# Patient Record
Sex: FEMALE | Race: BLACK OR AFRICAN AMERICAN | Employment: OTHER | URBAN - METROPOLITAN AREA
[De-identification: names, ages, dates, MRNs, and addresses within clinical notes are randomized per-mention and may not be internally consistent; named-entity substitution may affect disease eponyms.]

---

## 2017-04-03 ENCOUNTER — OFFICE VISIT (OUTPATIENT)
Dept: ORTHOPEDIC SURGERY | Age: 77
End: 2017-04-03

## 2017-04-03 VITALS
BODY MASS INDEX: 28.35 KG/M2 | DIASTOLIC BLOOD PRESSURE: 72 MMHG | HEART RATE: 63 BPM | HEIGHT: 63 IN | WEIGHT: 160 LBS | RESPIRATION RATE: 16 BRPM | SYSTOLIC BLOOD PRESSURE: 129 MMHG

## 2017-04-03 DIAGNOSIS — M48.061 LUMBAR SPINAL STENOSIS: ICD-10-CM

## 2017-04-03 DIAGNOSIS — M51.36 DDD (DEGENERATIVE DISC DISEASE), LUMBAR: ICD-10-CM

## 2017-04-03 DIAGNOSIS — M54.16 LUMBAR NEURITIS: ICD-10-CM

## 2017-04-03 DIAGNOSIS — M96.1 LUMBAR POST-LAMINECTOMY SYNDROME: ICD-10-CM

## 2017-04-03 DIAGNOSIS — M54.42 LOW BACK PAIN WITH LEFT-SIDED SCIATICA, UNSPECIFIED BACK PAIN LATERALITY, UNSPECIFIED CHRONICITY: Primary | ICD-10-CM

## 2017-04-03 RX ORDER — GABAPENTIN 300 MG/1
300 CAPSULE ORAL 3 TIMES DAILY
Qty: 90 CAP | Refills: 1 | Status: SHIPPED | OUTPATIENT
Start: 2017-04-03 | End: 2017-06-16 | Stop reason: ALTCHOICE

## 2017-04-03 RX ORDER — METHYLPREDNISOLONE 4 MG/1
TABLET ORAL
Qty: 1 DOSE PACK | Refills: 0 | Status: SHIPPED | OUTPATIENT
Start: 2017-04-03 | End: 2017-05-16 | Stop reason: ALTCHOICE

## 2017-04-03 RX ORDER — HYDROCODONE BITARTRATE AND ACETAMINOPHEN 5; 325 MG/1; MG/1
TABLET ORAL
Refills: 0 | COMMUNITY
Start: 2017-02-07 | End: 2017-06-16 | Stop reason: ALTCHOICE

## 2017-04-03 RX ORDER — NAPROXEN 500 MG/1
500 TABLET ORAL 2 TIMES DAILY WITH MEALS
Qty: 30 TAB | Refills: 0 | Status: SHIPPED | OUTPATIENT
Start: 2017-04-03 | End: 2017-06-16 | Stop reason: ALTCHOICE

## 2017-04-03 NOTE — PROGRESS NOTES
[facility]  [address], [place]   Phone: [phone]  Fax: [phone]        PROGRESS NOTE      HISTORY OF PRESENT ILLNESS:  The patient is a 68 y.o. female and was seen today for follow up of right shoulder pain extending into the RUE n a C6 distribution to the 1st digit right hand, without specific injury. She states pain started on 1/5/16. She reports dropping objects with the right hand that has increased a little in frequency. She denies any recent falls. She states pain waxes and wanes in nature. Notes from Dr. Lexy Dela Cruz were reviewed. Notes from physical therapy was reviewed. Pt completed physical therapy with relief and continues with her HEP. She reports previous shoulder injection without relief. She was treated with Percocet with minimal relief. She reports Medrol Dosepak did not provide relief. Pt has been able to decrease her Neurontin to 600 mg 1 tab daily without an increase in pain. Of note, patient has a pacemaker and is not a candidate for an MRI. Cervical spine x-ray from 1/16/16 was reviewed. Per report there was no acute fracture or dislocation cervical spine. There was degenerative discogenic changes C4-C7, most prominent at C5-C6, slightly progressed from comparison study. Cervical spine CT from 2/10/16 was reviewed. Per report, there was multilevel central stenosis due to disc osteophyte complex involving C4-C5 through C6-C7 levels. Stenosis is greatest at C4-C5, mild to moderate in degree. Additional mild-to-moderate C4-C5 and mild C5-C6 cord deformity is present. No high-grade central stenosis is seen. Mild-to-moderate multilevel foraminal stenosis. A RUE EMG from 2/17/16 was reviewed. Per report, it was suggestive of a chronic C7 radiculopathy on the right sided. Patient denies fever, weight loss, or skin changes. Patient denies history of glaucoma, DM, or seizures.  At her last clinical appointment, she had been doing well and was able to decrease her Neurontin 600 mg to 1 tab daily. The patient was instructed to d/c Neurontin as tolerated. If pain increased, she was to restart the medication. She did not need refills at that time. I encouraged patient to perform her HEP daily. The patient returns today with a new complaint of low back and left groin pain into the LLE extending posteriorly to the calf worsening x 1 month. She rates pain 10/10, elevated since her last visit (0/10). Her neck, shoulder and RUE continue to do well. Pt is accompanied by her  today. This is a patient with a diagnosis of lumbar postlaminectomy syndrome with h/o lumbar spinal surgery (cannot recall which surgeon). Prior to her surgery, she did not have much in the way of low back pain but just LLE pain. Pt underwent lumbar blocks in the past but not recently. She has not recently attended PT/chirorpractic treatment. Pt has taken Aleve 2 tabs TID with temporary relief.  reviewed. Past Medical History:   Diagnosis Date    Arrhythmia     Cardiac disease     Hypercholesteremia     Hyperlipidemia     Hypertension     Pacemaker         Social History     Social History    Marital status:      Spouse name: N/A    Number of children: N/A    Years of education: N/A     Occupational History    Not on file. Social History Main Topics    Smoking status: Former Smoker     Packs/day: 0.50     Years: 30.00     Quit date: 3/21/2009    Smokeless tobacco: Never Used    Alcohol use No    Drug use: No    Sexual activity: Not on file     Other Topics Concern    Not on file     Social History Narrative       Current Outpatient Prescriptions   Medication Sig Dispense Refill    HYDROcodone-acetaminophen (NORCO) 5-325 mg per tablet take 1 tablet by mouth every 4 hours if needed  0    gabapentin (NEURONTIN) 300 mg capsule Take 1 Cap by mouth three (3) times daily.  90 Cap 1    methylPREDNISolone (MEDROL DOSEPACK) 4 mg tablet Per dose pack instructions 1 Dose Pack 0    naproxen (NAPROSYN) 500 mg tablet Take 1 Tab by mouth two (2) times daily (with meals). 30 Tab 0    furosemide (LASIX) 20 mg tablet   0    gabapentin (NEURONTIN) 300 mg capsule Take 1 Cap by mouth three (3) times daily. 90 Cap 1    amiodarone (CORDARONE) 200 mg tablet   0    amLODIPine (NORVASC) 10 mg tablet   1    losartan (COZAAR) 100 mg tablet   0    metoprolol succinate (TOPROL-XL) 25 mg XL tablet   0    potassium chloride (K-DUR, KLOR-CON) 20 mEq tablet   1    triamterene-hydrochlorothiazide (DYAZIDE) 37.5-25 mg per capsule   1    aspirin delayed-release 81 mg tablet Take  by mouth daily.  cholecalciferol (VITAMIN D3) 1,000 unit tablet Take  by mouth daily.  gabapentin (NEURONTIN) 600 mg tablet take 1 tablet by mouth three times a day 90 Tab 2    oxyCODONE-acetaminophen (PERCOCET) 5-325 mg per tablet   0    pravastatin (PRAVACHOL) 40 mg tablet   1    RANEXA 500 mg SR tablet       fluticasone (FLONASE) 50 mcg/actuation nasal spray nightly.  nitroglycerin (NITROSTAT) 0.4 mg SL tablet by SubLINGual route every five (5) minutes as needed for Chest Pain.  gabapentin (NEURONTIN) 100 mg capsule Take 1 Cap by mouth three (3) times daily. 90 Cap 1       Allergies   Allergen Reactions    Plavix [Clopidogrel] Rash          PHYSICAL EXAMINATION    Visit Vitals    /72 (BP 1 Location: Left arm, BP Patient Position: Sitting)    Pulse 63    Resp 16    Ht 5' 3\" (1.6 m)    Wt 160 lb (72.6 kg)    BMI 28.34 kg/m2       CONSTITUTIONAL: NAD, A&O x 3  SENSATION: No increase in groin pain with internal rotation of the left hip. No increased tenderness to palpation of the left SI joint. Intact to light touch throughout  NEURO: Oscar's is negative bilaterally. RANGE OF MOTION: The patient has full passive range of motion in all four extremities.   MOTOR:  Straight Leg Raise: Negative, bilateral   NURY SIGN: Negative, left     Shoulder AB/Flex Elbow Flex Wrist Ext Elbow Ext Wrist Flex Hand Intrin Tone   Right +4/5 +4/5 +4/5 +4/5 +4/5 +4/5 +4/5   Left +4/5 +4/5 +4/5 +4/5 +4/5 +4/5 +4/5              Hip Flex Knee Ext Knee Flex Ankle DF GTE Ankle PF Tone   Right +4/5 +4/5 +4/5 +4/5 +4/5 +4/5 +4/5   Left +4/5 +4/5 +4/5 +4/5 +4/5 +4/5 +4/5     RADIOGRAPHS  Preliminary reading of lumbar plain films:  Posterior fusion L3-L4. Hardware intact. Degenerative disc disease at L1-L2, L2-L3 and L4-L5. Anterior osteophytes L1-2-3-4-L5. No acute pathology identified. These are being sent out for official reading by Dr. Rubina Quezada. ASSESSMENT   Hiren Hughes was seen today for back pain and leg pain. Diagnoses and all orders for this visit:    Low back pain with left-sided sciatica, unspecified back pain laterality, unspecified chronicity  -     [33389] L/S 2-3 views  -     REFERRAL TO PHYSICAL THERAPY    Lumbar neuritis  -     REFERRAL TO PHYSICAL THERAPY    Lumbar post-laminectomy syndrome  -     REFERRAL TO PHYSICAL THERAPY    DDD (degenerative disc disease), lumbar  -     REFERRAL TO PHYSICAL THERAPY    Lumbar spinal stenosis  -     REFERRAL TO PHYSICAL THERAPY    Other orders  -     gabapentin (NEURONTIN) 300 mg capsule; Take 1 Cap by mouth three (3) times daily. -     methylPREDNISolone (MEDROL DOSEPACK) 4 mg tablet; Per dose pack instructions  -     naproxen (NAPROSYN) 500 mg tablet; Take 1 Tab by mouth two (2) times daily (with meals). IMPRESSION AND PLAN:  The patient presents with a new complaint of low back pain with left-sided radicular symptoms with previous h/o of lumbar fusion. Her neck, right shoulder and RUE continue to do well. I discussed multiple treatment options. I will start her on Medrol Dosepak. She will d/c her Aleve. I gave her a prescription for Naproxen following completion of the Medrol Dosepak. I will restart her on Neurontin 300 mg TID. I will refer her to physical therapy for her lumbar spine with an emphasis on HEP.  She should continue performing her cervical spine HEP daily. Depending on how patient responds to these treatments, we may proceed with a lumbar spine CT myelogram. I will see the patient back in 1 month's time or earlier if needed. Written by Jeffry Hernandez, as dictated by Blanco Baron MD  I examined the patient, reviewed and agree with the note.

## 2017-04-03 NOTE — MR AVS SNAPSHOT
Visit Information Date & Time Provider Department Dept. Phone Encounter #  
 4/3/2017 10:25 AM Amanda Natarajan MD [facility] [place] [phone] [id number] Follow-up Instructions Return in about 4 weeks (around 5/1/2017). Upcoming Health Maintenance Date Due DTaP/Tdap/Td series (1 - Tdap) 12/26/1961 ZOSTER VACCINE AGE 60> 12/26/2000 GLAUCOMA SCREENING Q2Y 12/26/2005 OSTEOPOROSIS SCREENING (DEXA) 12/26/2005 Pneumococcal 65+ Low/Medium Risk (1 of 2 - PCV13) 12/26/2005 MEDICARE YEARLY EXAM 12/26/2005 INFLUENZA AGE 9 TO ADULT 8/1/2016 Allergies as of 4/3/2017  Review Complete On: 4/3/2017 By: Amanda Natarajan MD  
  
 Severity Noted Reaction Type Reactions Plavix [Clopidogrel]  02/01/2016    Rash Current Immunizations  Never Reviewed No immunizations on file. Not reviewed this visit You Were Diagnosed With   
  
 Codes Comments Low back pain with left-sided sciatica, unspecified back pain laterality, unspecified chronicity    -  Primary ICD-10-CM: M54.42 
ICD-9-CM: 724.3 Lumbar neuritis     ICD-10-CM: M54.16 
ICD-9-CM: 724.4 Lumbar post-laminectomy syndrome     ICD-10-CM: M96.1 ICD-9-CM: 722.83 DDD (degenerative disc disease), lumbar     ICD-10-CM: M51.36 
ICD-9-CM: 722.52 Lumbar spinal stenosis     ICD-10-CM: M48.06 
ICD-9-CM: 724.02 Vitals BP Pulse Resp Height(growth percentile) Weight(growth percentile) BMI  
 129/72 (BP 1 Location: Left arm, BP Patient Position: Sitting) 63 16 5' 3\" (1.6 m) 160 lb (72.6 kg) 28.34 kg/m2 Smoking Status Former Smoker BMI and BSA Data Body Mass Index Body Surface Area  
 28.34 kg/m 2 1.8 m 2 Preferred Pharmacy Pharmacy Name Phone [facility] [address] [phone] Your Updated Medication List  
  
   
 This list is accurate as of: 4/3/17 10:55 AM.  Always use your most recent med list.  
  
  
  
  
 amiodarone 200 mg tablet Commonly known as:  CORDARONE  
  
 amLODIPine 10 mg tablet Commonly known as:  NORVASC  
  
 aspirin delayed-release 81 mg tablet Take  by mouth daily. cholecalciferol 1,000 unit tablet Commonly known as:  VITAMIN D3 Take  by mouth daily. fluticasone 50 mcg/actuation nasal spray Commonly known as:  FLONASE  
nightly. furosemide 20 mg tablet Commonly known as:  LASIX  
  
 * gabapentin 100 mg capsule Commonly known as:  NEURONTIN Take 1 Cap by mouth three (3) times daily. * gabapentin 300 mg capsule Commonly known as:  NEURONTIN Take 1 Cap by mouth three (3) times daily. * gabapentin 600 mg tablet Commonly known as:  NEURONTIN  
take 1 tablet by mouth three times a day * gabapentin 300 mg capsule Commonly known as:  NEURONTIN Take 1 Cap by mouth three (3) times daily. HYDROcodone-acetaminophen 5-325 mg per tablet Commonly known as:  Maryaugie Grumbles  
take 1 tablet by mouth every 4 hours if needed  
  
 losartan 100 mg tablet Commonly known as:  COZAAR  
  
 methylPREDNISolone 4 mg tablet Commonly known as:  Brand Saint Per dose pack instructions  
  
 metoprolol succinate 25 mg XL tablet Commonly known as:  TOPROL-XL  
  
 naproxen 500 mg tablet Commonly known as:  NAPROSYN Take 1 Tab by mouth two (2) times daily (with meals). nitroglycerin 0.4 mg SL tablet Commonly known as:  NITROSTAT  
by SubLINGual route every five (5) minutes as needed for Chest Pain. oxyCODONE-acetaminophen 5-325 mg per tablet Commonly known as:  PERCOCET  
  
 potassium chloride 20 mEq tablet Commonly known as:  K-DUR, KLOR-CON  
  
 pravastatin 40 mg tablet Commonly known as:  PRAVACHOL  
  
 RANEXA 500 mg SR tablet Generic drug:  ranolazine ER  
  
 triamterene-hydroCHLOROthiazide 37.5-25 mg per capsule Commonly known as:  Luz Maria Ni * Notice: This list has 4 medication(s) that are the same as other medications prescribed for you. Read the directions carefully, and ask your doctor or other care provider to review them with you. Prescriptions Sent to Pharmacy Refills  
 gabapentin (NEURONTIN) 300 mg capsule 1 Sig: Take 1 Cap by mouth three (3) times daily. Class: Normal  
 Pharmacy: [facility], [address] [facility] Ph #: [phone] Route: Oral  
 methylPREDNISolone (MEDROL DOSEPACK) 4 mg tablet 0 Sig: Per dose pack instructions Class: Normal  
 [facility] [place] [address] [facility] Ph #: [phone]  
 naproxen (NAPROSYN) 500 mg tablet 0 Sig: Take 1 Tab by mouth two (2) times daily (with meals). Class: Normal  
 Pharmacy: [facility], [address] [place] [address] Ph #: [phone] Route: Oral  
  
We Performed the Following AMB POC XRAY, SPINE, LUMBOSACRAL; 2 O [88445 CPT(R)] REFERRAL TO PHYSICAL THERAPY [XHX08 Custom] Comments:  
 DX:LBP to LLE 
HEP 
LOCATION:[place] 2-3 visits/ 2-3 weeks Follow-up Instructions Return in about 4 weeks (around 5/1/2017). Referral Information Referral ID Referred By Referred To  
  
 [id number] ALEXANDRA ALONSO III Not Available Visits Status Start Date End Date 1 New Request 4/3/17 4/3/18 If your referral has a status of pending review or denied, additional information will be sent to support the outcome of this decision. Introducing [facility]! Johanny Osorio introduces [employer] patient portal. Now you can access parts of your medical record, email your doctor's office, and request medication refills online. 1. In your internet browser, go to [email or web address] 2. Click on the First Time User? Click Here link in the Sign In box.  You will see the New Member Sign Up page. 3. Enter your Holidu Access Code exactly as it appears below. You will not need to use this code after youve completed the sign-up process. If you do not sign up before the expiration date, you must request a new code. · Holidu Access Code: [email or web address] Expires: 7/2/2017  8:15 AM 
 
4. Enter the last four digits of your Social Security Number (xxxx) and Date of Birth (mm/dd/yyyy) as indicated and click Submit. You will be taken to the next sign-up page. 5. Create a EyeQuantt ID. This will be your Holidu login ID and cannot be changed, so think of one that is secure and easy to remember. 6. Create a Holidu password. You can change your password at any time. 7. Enter your Password Reset Question and Answer. This can be used at a later time if you forget your password. 8. Enter your e-mail address. You will receive e-mail notification when new information is available in [address]. 9. Click Sign Up. You can now view and download portions of your medical record. 10. Click the Download Summary menu link to download a portable copy of your medical information. If you have questions, please visit the Frequently Asked Questions section of the Holidu website. Remember, Holidu is NOT to be used for urgent needs. For medical emergencies, dial 911. Now available from your iPhone and Android! Please provide this summary of care documentation to your next provider. Your primary care clinician is listed as Eduardo Prader. If you have any questions after today's visit, please call [phone].

## 2017-05-01 ENCOUNTER — OFFICE VISIT (OUTPATIENT)
Dept: ORTHOPEDIC SURGERY | Age: 77
End: 2017-05-01

## 2017-05-01 ENCOUNTER — DOCUMENTATION ONLY (OUTPATIENT)
Dept: ORTHOPEDIC SURGERY | Age: 77
End: 2017-05-01

## 2017-05-01 VITALS
DIASTOLIC BLOOD PRESSURE: 62 MMHG | HEART RATE: 73 BPM | HEIGHT: 63 IN | BODY MASS INDEX: 27.82 KG/M2 | WEIGHT: 157 LBS | SYSTOLIC BLOOD PRESSURE: 131 MMHG

## 2017-05-01 DIAGNOSIS — M96.1 LUMBAR POST-LAMINECTOMY SYNDROME: ICD-10-CM

## 2017-05-01 DIAGNOSIS — M47.812 SPONDYLOSIS WITHOUT MYELOPATHY OR RADICULOPATHY, CERVICAL REGION: ICD-10-CM

## 2017-05-01 DIAGNOSIS — M96.1 LUMBAR POST-LAMINECTOMY SYNDROME: Primary | ICD-10-CM

## 2017-05-01 DIAGNOSIS — M54.42 LOW BACK PAIN WITH LEFT-SIDED SCIATICA, UNSPECIFIED BACK PAIN LATERALITY, UNSPECIFIED CHRONICITY: ICD-10-CM

## 2017-05-01 DIAGNOSIS — M51.36 OTHER INTERVERTEBRAL DISC DEGENERATION, LUMBAR REGION: ICD-10-CM

## 2017-05-01 DIAGNOSIS — M54.16 RADICULOPATHY, LUMBAR REGION: ICD-10-CM

## 2017-05-01 RX ORDER — GABAPENTIN 600 MG/1
600 TABLET ORAL 3 TIMES DAILY
Qty: 90 TAB | Refills: 1 | Status: SHIPPED | OUTPATIENT
Start: 2017-05-01 | End: 2017-06-16 | Stop reason: ALTCHOICE

## 2017-05-01 NOTE — PROGRESS NOTES
[facility]  [address]  [address], [address] [place]   Phone: [phone]  Fax: [phone]        PROGRESS NOTE      HISTORY OF PRESENT ILLNESS:  The patient is a 68 y.o. female and was seen today for follow up of low back and left groin pain into the LLE extending posteriorly to the calf worsening x 2 months. This is a patient with a diagnosis of lumbar postlaminectomy syndrome with h/o lumbar spinal surgery from ~ 2012 performed by Dr. Ariana Lara. Prior to her surgery, she did not have much in the way of low back pain but just LLE pain. Pt underwent lumbar blocks in the past but not recently. She was initially seen for c/o intermittent right shoulder pain extending into the RUE n a C6 distribution to the 1st digit right hand, which she reports is doing well. She reports dropping objects with the right hand that has increased a little in frequency. Pt denies any recent falls. Notes from Dr. Miri Vizcarra were reviewed. Pt completed physical therapy for her cervical spine with relief and continues with her HEP. She reports previous shoulder injection without relief. She was treated with Percocet with minimal relief. She reports Medrol Dosepak did not provide relief. Pt has been able to decrease her Neurontin to 600 mg 1 tab daily without an increase in pain. Pt has taken Aleve 2 tabs TID with temporary relief. Pt denies fever, weight loss, or skin changes. Pt denies history of glaucoma, DM, or seizures. Of note, patient has a pacemaker and is not a candidate for an MRI. Cervical spine x-ray from 1/16/16 was reviewed. Per report there was no acute fracture or dislocation cervical spine. There was degenerative discogenic changes C4-C7, most prominent at C5-C6, slightly progressed from comparison study. Cervical spine CT from 2/10/16 was reviewed. Per report, there was multilevel central stenosis due to disc osteophyte complex involving C4-C5 through C6-C7 levels.  Stenosis is greatest at C4-C5, mild to moderate in degree. Additional mild-to-moderate C4-C5 and mild C5-C6 cord deformity is present. No high-grade central stenosis is seen. Mild-to-moderate multilevel foraminal stenosis. A RUE EMG from 2/17/16 was reviewed. Per report, it was suggestive of a chronic C7 radiculopathy on the right sided. Preliminary reading of lumbar plain films revealed posterior fusion L3-L4. Hardware intact. Degenerative disc disease at L1-L2, L2-L3 and L4-L5. Anterior osteophytes L1-2-3-4-L5. No acute pathology identified. At her last clinical appointment, the patient presented with a new complaint of low back pain with left-sided radicular symptoms with previous h/o of lumbar fusion. Her neck, right shoulder and RUE continued to do well. I discussed multiple treatment options. I started her on Medrol Dosepak. She d/cd her Aleve. I gave her a prescription for Naproxen following completion of the Medrol Dosepak. I restarted her on Neurontin 300 mg TID. I referred her to physical therapy for her lumbar spine with an emphasis on HEP. She should continue performing her cervical spine HEP daily. Depending on how patient responds to these treatments, we may proceed with a lumbar spine CT myelogram.     The patient returns today with low back pain radiating into the left groin. Pt denies radicular type symptoms of the LLE at this time. She continues to rate pain 10/10. Her pain is the worst in the AM. Therapy notes reviewed. She continues to be enrolled in physical therapy with slight relief. Pt is completing her HEP regularly. She is tolerating Neurontin 300 mg TID without relief. Pt completed Medrol Dosepak without benefit.  reviewed.        Past Medical History:   Diagnosis Date    Arrhythmia     Cardiac disease     Hypercholesteremia     Hyperlipidemia     Hypertension     Pacemaker         Social History     Social History    Marital status:      Spouse name: N/A    Number of children: N/A    Years of education: N/A     Occupational History    Not on file. Social History Main Topics    Smoking status: Former Smoker     Packs/day: 0.50     Years: 30.00     Quit date: 3/21/2009    Smokeless tobacco: Never Used    Alcohol use No    Drug use: No    Sexual activity: Not on file     Other Topics Concern    Not on file     Social History Narrative       Current Outpatient Prescriptions   Medication Sig Dispense Refill    gabapentin (NEURONTIN) 600 mg tablet Take 1 Tab by mouth three (3) times daily. 90 Tab 1    gabapentin (NEURONTIN) 300 mg capsule Take 1 Cap by mouth three (3) times daily. 90 Cap 1    naproxen (NAPROSYN) 500 mg tablet Take 1 Tab by mouth two (2) times daily (with meals). 30 Tab 0    furosemide (LASIX) 20 mg tablet   0    amiodarone (CORDARONE) 200 mg tablet   0    amLODIPine (NORVASC) 10 mg tablet   1    losartan (COZAAR) 100 mg tablet   0    metoprolol succinate (TOPROL-XL) 25 mg XL tablet   0    potassium chloride (K-DUR, KLOR-CON) 20 mEq tablet   1    pravastatin (PRAVACHOL) 40 mg tablet   1    RANEXA 500 mg SR tablet       triamterene-hydrochlorothiazide (DYAZIDE) 37.5-25 mg per capsule   1    aspirin delayed-release 81 mg tablet Take  by mouth daily.  cholecalciferol (VITAMIN D3) 1,000 unit tablet Take  by mouth daily.  fluticasone (FLONASE) 50 mcg/actuation nasal spray nightly.  nitroglycerin (NITROSTAT) 0.4 mg SL tablet by SubLINGual route every five (5) minutes as needed for Chest Pain.  HYDROcodone-acetaminophen (NORCO) 5-325 mg per tablet take 1 tablet by mouth every 4 hours if needed  0    methylPREDNISolone (MEDROL DOSEPACK) 4 mg tablet Per dose pack instructions 1 Dose Pack 0    gabapentin (NEURONTIN) 600 mg tablet take 1 tablet by mouth three times a day 90 Tab 2    gabapentin (NEURONTIN) 300 mg capsule Take 1 Cap by mouth three (3) times daily.  90 Cap 1    oxyCODONE-acetaminophen (PERCOCET) 5-325 mg per tablet   0    gabapentin (NEURONTIN) 100 mg capsule Take 1 Cap by mouth three (3) times daily. 90 Cap 1       Allergies   Allergen Reactions    Plavix [Clopidogrel] Rash          PHYSICAL EXAMINATION    Visit Vitals    /62    Pulse 73    Ht 5' 3\" (1.6 m)    Wt 157 lb (71.2 kg)    BMI 27.81 kg/m2       CONSTITUTIONAL: NAD, A&O x 3  SENSATION: No increased in groin pain with internal rotation of the left hip. Intact to light touch throughout  NEURO: Oscar's is negative bilaterally. RANGE OF MOTION: The patient has full passive range of motion in all four extremities. MOTOR:  Straight Leg Raise: Negative, bilateral     Shoulder AB/Flex Elbow Flex Wrist Ext Elbow Ext Wrist Flex Hand Intrin Tone   Right +4/5 +4/5 +4/5 +4/5 +4/5 +4/5 +4/5   Left +4/5 +4/5 +4/5 +4/5 +4/5 +4/5 +4/5              Hip Flex Knee Ext Knee Flex Ankle DF GTE Ankle PF Tone   Right +4/5 +4/5 +4/5 +4/5 +4/5 +4/5 +4/5   Left +4/5 +4/5 +4/5 +4/5 +4/5 +4/5 +4/5       ASSESSMENT   Jason Ferreira was seen today for follow-up. Diagnoses and all orders for this visit:    Lumbar post-laminectomy syndrome  -     XR MYELO LUMBAR; Future  -     CT SPINE LUMB W WO CONT; Future  -     BUN; Future  -     CREATININE; Future    Spondylosis without myelopathy or radiculopathy, cervical region  -     XR MYELO LUMBAR; Future  -     CT SPINE LUMB W WO CONT; Future  -     BUN; Future  -     CREATININE; Future    Low back pain with left-sided sciatica, unspecified back pain laterality, unspecified chronicity  -     XR MYELO LUMBAR; Future  -     CT SPINE LUMB W WO CONT; Future  -     BUN; Future  -     CREATININE; Future    Radiculopathy, lumbar region  -     XR MYELO LUMBAR; Future  -     CT SPINE LUMB W WO CONT; Future  -     BUN; Future  -     CREATININE; Future    Other intervertebral disc degeneration, lumbar region  -     XR MYELO LUMBAR; Future  -     CT SPINE LUMB W WO CONT; Future  -     BUN; Future  -     CREATININE;  Future    Other orders  -     gabapentin (NEURONTIN) 600 mg tablet; Take 1 Tab by mouth three (3) times daily. IMPRESSION AND PLAN:  Her pain has localized to the low back and left groin. She denies LLE symptoms at his time. Her right shoulder and RUE continue to do well. I will set her up for a lumbar spine MRI w/ contrast. I advised patient to bring copies of films to next visit. In the interim, I will increase her Neurontin to 600 mg TID. Patient advised to call the office if intolerant to higher dose. She should complete physical therapy as prescribed and continue her HEP daily. I will see the patient back following MRI. Written by Mauro Wilder, as dictated by Nahomi Grant MD  I examined the patient, reviewed and agree with the note.

## 2017-05-01 NOTE — PROGRESS NOTES
CT/Myelogram Lumbar with and without is scheduled at [facility], [id number], on 05/12/17. Discontinue aspirin 5 days prior, NPO after midnight. Arrive 7:00AM, test 8:00AM. No Medicare pre-authorization is required.

## 2017-05-01 NOTE — MR AVS SNAPSHOT
Visit Information Date & Time Provider Department Dept. Phone Encounter #  
 5/1/2017  9:05 AM Jocelin Nichols, [address] [facility] [place] [phone] [id number] Follow-up Instructions Return for following MRI. Upcoming Health Maintenance Date Due DTaP/Tdap/Td series (1 - Tdap) 12/26/1961 ZOSTER VACCINE AGE 60> 12/26/2000 GLAUCOMA SCREENING Q2Y 12/26/2005 OSTEOPOROSIS SCREENING (DEXA) 12/26/2005 Pneumococcal 65+ Low/Medium Risk (1 of 2 - PCV13) 12/26/2005 MEDICARE YEARLY EXAM 12/26/2005 INFLUENZA AGE 9 TO ADULT 8/1/2017 Allergies as of 5/1/2017  Review Complete On: 5/1/2017 By: Jocelin Nichols MD  
  
 Severity Noted Reaction Type Reactions Plavix [Clopidogrel]  02/01/2016    Rash Current Immunizations  Never Reviewed No immunizations on file. Not reviewed this visit You Were Diagnosed With   
  
 Codes Comments Lumbar post-laminectomy syndrome    -  Primary ICD-10-CM: M96.1 ICD-9-CM: 722.83 Spondylosis without myelopathy or radiculopathy, cervical region     ICD-10-CM: M47.812 ICD-9-CM: 721.0 Low back pain with left-sided sciatica, unspecified back pain laterality, unspecified chronicity     ICD-10-CM: M54.42 
ICD-9-CM: 724.3 Radiculopathy, lumbar region     ICD-10-CM: M54.16 
ICD-9-CM: 724.4 Other intervertebral disc degeneration, lumbar region     ICD-10-CM: M51.36 
ICD-9-CM: 722.52 Vitals BP Pulse Height(growth percentile) Weight(growth percentile) BMI Smoking Status 131/62 73 5' 3\" (1.6 m) 157 lb (71.2 kg) 27.81 kg/m2 Former Smoker Vitals History BMI and BSA Data Body Mass Index Body Surface Area  
 27.81 kg/m 2 1.78 m 2 Preferred Pharmacy Pharmacy Name Phone [facility] [address] [place] [address] [phone] Your Updated Medication List  
  
   
 This list is accurate as of: 5/1/17  9:14 AM.  Always use your most recent med list.  
  
  
  
  
 amiodarone 200 mg tablet Commonly known as:  CORDARONE  
  
 amLODIPine 10 mg tablet Commonly known as:  NORVASC  
  
 aspirin delayed-release 81 mg tablet Take  by mouth daily. cholecalciferol 1,000 unit tablet Commonly known as:  VITAMIN D3 Take  by mouth daily. fluticasone 50 mcg/actuation nasal spray Commonly known as:  FLONASE  
nightly. furosemide 20 mg tablet Commonly known as:  LASIX  
  
 * gabapentin 100 mg capsule Commonly known as:  NEURONTIN Take 1 Cap by mouth three (3) times daily. * gabapentin 300 mg capsule Commonly known as:  NEURONTIN Take 1 Cap by mouth three (3) times daily. * gabapentin 600 mg tablet Commonly known as:  NEURONTIN  
take 1 tablet by mouth three times a day * gabapentin 300 mg capsule Commonly known as:  NEURONTIN Take 1 Cap by mouth three (3) times daily. * gabapentin 600 mg tablet Commonly known as:  NEURONTIN Take 1 Tab by mouth three (3) times daily. HYDROcodone-acetaminophen 5-325 mg per tablet Commonly known as:  Ivan Brittle  
take 1 tablet by mouth every 4 hours if needed  
  
 losartan 100 mg tablet Commonly known as:  COZAAR  
  
 methylPREDNISolone 4 mg tablet Commonly known as:  Mickiel Meres Per dose pack instructions  
  
 metoprolol succinate 25 mg XL tablet Commonly known as:  TOPROL-XL  
  
 naproxen 500 mg tablet Commonly known as:  NAPROSYN Take 1 Tab by mouth two (2) times daily (with meals). nitroglycerin 0.4 mg SL tablet Commonly known as:  NITROSTAT  
by SubLINGual route every five (5) minutes as needed for Chest Pain. oxyCODONE-acetaminophen 5-325 mg per tablet Commonly known as:  PERCOCET  
  
 potassium chloride 20 mEq tablet Commonly known as:  K-DUR, KLOR-CON  
  
 pravastatin 40 mg tablet Commonly known as:  PRAVACHOL  
  
 RANEXA 500 mg SR tablet Generic drug:  ranolazine ER  
  
 triamterene-hydroCHLOROthiazide 37.5-25 mg per capsule Commonly known as:  Luis Alberto Avendaño * Notice: This list has 5 medication(s) that are the same as other medications prescribed for you. Read the directions carefully, and ask your doctor or other care provider to review them with you. Prescriptions Sent to Pharmacy Refills  
 gabapentin (NEURONTIN) 600 mg tablet 1 Sig: Take 1 Tab by mouth three (3) times daily. Class: Normal  
 Pharmacy: [address] [place] [address], [address] [place] [address] #: [phone] Route: Oral  
  
Follow-up Instructions Return for following MRI. To-Do List   
 05/01/2017 Lab:  BUN   
  
 05/01/2017 Lab:  CREATININE   
  
 05/08/2017 Imaging:  CT SPINE LUMB W WO CONT   
  
 05/08/2017 Imaging:  XR MYELO LUMBAR Introducing [facility] SERVICES! Kyleigh Mata introduces [employer] patient portal. Now you can access parts of your medical record, email your doctor's office, and request medication refills online. 1. In your internet browser, go to [email or web address] 2. Click on the First Time User? Click Here link in the Sign In box. You will see the New Member Sign Up page. 3. Enter your [employer] Access Code exactly as it appears below. You will not need to use this code after youve completed the sign-up process. If you do not sign up before the expiration date, you must request a new code. · [employer] Access Code: [email or web address] Expires: 7/2/2017  8:15 AM 
 
4. Enter the last four digits of your Social Security Number (xxxx) and Date of Birth (mm/dd/yyyy) as indicated and click Submit. You will be taken to the next sign-up page. 5. Create a [employer] ID. This will be your [employer] login ID and cannot be changed, so think of one that is secure and easy to remember. 6. Create a [employer] password. You can change your password at any time. 7. Enter your Password Reset Question and Answer. This can be used at a later time if you forget your password. 8. Enter your e-mail address. You will receive e-mail notification when new information is available in [address] 9. Click Sign Up. You can now view and download portions of your medical record. 10. Click the Download Summary menu link to download a portable copy of your medical information. If you have questions, please visit the Frequently Asked Questions section of the [employer] website. Remember, [employer] is NOT to be used for urgent needs. For medical emergencies, dial 911. Now available from your iPhone and Android! Please provide this summary of care documentation to your next provider. Your primary care clinician is listed as Andrew Padgett. If you have any questions after today's visit, please call [phone].

## 2017-05-16 ENCOUNTER — OFFICE VISIT (OUTPATIENT)
Dept: ORTHOPEDIC SURGERY | Age: 77
End: 2017-05-16

## 2017-05-16 VITALS
SYSTOLIC BLOOD PRESSURE: 119 MMHG | WEIGHT: 156 LBS | BODY MASS INDEX: 27.63 KG/M2 | DIASTOLIC BLOOD PRESSURE: 76 MMHG | RESPIRATION RATE: 18 BRPM | HEART RATE: 80 BPM

## 2017-05-16 DIAGNOSIS — M96.1 LUMBAR POST-LAMINECTOMY SYNDROME: ICD-10-CM

## 2017-05-16 DIAGNOSIS — M47.812 SPONDYLOSIS WITHOUT MYELOPATHY OR RADICULOPATHY, CERVICAL REGION: ICD-10-CM

## 2017-05-16 DIAGNOSIS — M54.16 RADICULOPATHY, LUMBAR REGION: ICD-10-CM

## 2017-05-16 DIAGNOSIS — G03.9 ARACHNOIDITIS: ICD-10-CM

## 2017-05-16 DIAGNOSIS — M48.061 LUMBAR SPINAL STENOSIS: Primary | ICD-10-CM

## 2017-05-16 RX ORDER — DULOXETIN HYDROCHLORIDE 20 MG/1
20 CAPSULE, DELAYED RELEASE ORAL DAILY
Qty: 30 CAP | Refills: 1 | Status: SHIPPED | OUTPATIENT
Start: 2017-05-16 | End: 2018-01-30 | Stop reason: ALTCHOICE

## 2017-05-16 RX ORDER — DIAZEPAM 10 MG/1
TABLET ORAL
Qty: 1 TAB | Refills: 0 | OUTPATIENT
Start: 2017-05-16 | End: 2017-06-16 | Stop reason: ALTCHOICE

## 2017-05-16 NOTE — PROGRESS NOTES
[facility]  [address]  [address], [address]   Phone: [phone]  Fax: [phone]        PROGRESS NOTE      HISTORY OF PRESENT ILLNESS:  The patient is a 68 y.o. female and was seen today for follow up of low back pain radiating into the left groin. Pt denies radicular type symptoms of the LLE at this time which previously extended posteriorly to the calf. She notes her pain is the worst in the AM. This is a patient with a diagnosis of lumbar postlaminectomy syndrome with h/o lumbar spinal surgery from ~ 2012 performed by Dr. Antwan Gaytan. Prior to her surgery, she did not have much in the way of low back pain but just LLE pain. Pt underwent lumbar blocks roughly four years ago but none recently. She was initially seen for c/o intermittent right shoulder pain extending into the RUE n a C6 distribution to the 1st digit right hand, which she reports is doing well. She reports dropping objects with the right hand that has increased a little in frequency. Pt denies any recent falls. Notes from Dr. Elder Haider were reviewed. Pt completed physical therapy for her cervical spine with relief and continues with her HEP. She reports previous shoulder injection without relief. She was treated with Percocet with minimal relief. Pt is no longer taking Aleve. She is tolerating Neurontin 300 mg TID without relief. Pt completed Medrol Dosepak without benefit. Pt denies fever, weight loss, or skin changes. Pt denies history of glaucoma, DM, or seizures. Of note, patient has a pacemaker and is not a candidate for an MRI. Cervical spine XR from 1/16/16 was reviewed. Per report there was no acute fracture or dislocation cervical spine. There was degenerative discogenic changes C4-C7, most prominent at C5-C6, slightly progressed from comparison study. Cervical spine CT from 2/10/16 was reviewed.  Per report, there was multilevel central stenosis due to disc osteophyte complex involving C4-C5 through C6-C7 levels. Stenosis is greatest at C4-C5, mild to moderate in degree. Additional mild-to-moderate C4-C5 and mild C5-C6 cord deformity is present. No high-grade central stenosis is seen. Mild-to-moderate multilevel foraminal stenosis. A RUE EMG from 2/17/16 was reviewed. Per report, it was suggestive of a chronic C7 radiculopathy on the right sided. Preliminary reading of lumbar plain films revealed posterior fusion L3-L4. Hardware intact. Degenerative disc disease at L1-L2, L2-L3 and L4-L5. Anterior osteophytes L1-2-3-4-L5. No acute pathology identified. At her last clinical appointment, her pain had localized to the low back and left groin. She denied LLE symptoms at that time. Her right shoulder and RUE continued to do well. I set her up for a lumbar spine CT myelogram. I increased her Neurontin to 600 mg TID. She should complete physical therapy as prescribed and continue her HEP daily. The patient returns today with pain location and distribution remain unchanged. Her neck and RUE continue to do well. She rates pain 7/10, a slight decrease since her last visit (10/10). Pt reports nervousness with increased dose of NEURONTIN 600 mg TID. She completed physical therapy with slight relief. Pt is completing her HEP regularly. Lumbar spine CT myelogram dated 5/12/17 reviewed. Per report, multilevel degenerative disc and degenerative facet disease with postsurgical changes at L3-4. Overall, central canal stenosis appears to be mild but the severity of the stenosis does appear to slightly increase on the upright myelographic views. Multilevel foraminal narrowing is present and is severe on the left at L4-5. progressive clumping and peripheral displacement of the cauda equina nerve roots extending below the L3 level, consistent with the sequela of arachnoiditis.  reviewed.        Past Medical History:   Diagnosis Date    Arrhythmia     Cardiac disease     Hypercholesteremia     Hyperlipidemia     Hypertension     Pacemaker         Social History     Social History    Marital status:      Spouse name: N/A    Number of children: N/A    Years of education: N/A     Occupational History    Not on file. Social History Main Topics    Smoking status: Former Smoker     Packs/day: 0.50     Years: 30.00     Quit date: 3/21/2009    Smokeless tobacco: Never Used    Alcohol use No    Drug use: No    Sexual activity: Not on file     Other Topics Concern    Not on file     Social History Narrative       Current Outpatient Prescriptions   Medication Sig Dispense Refill    DULoxetine (CYMBALTA) 20 mg capsule Take 1 Cap by mouth daily. Indications: NEUROPATHIC PAIN 30 Cap 1    gabapentin (NEURONTIN) 600 mg tablet Take 1 Tab by mouth three (3) times daily. (Patient taking differently: Take 600 mg by mouth daily.) 90 Tab 1    naproxen (NAPROSYN) 500 mg tablet Take 1 Tab by mouth two (2) times daily (with meals). 30 Tab 0    furosemide (LASIX) 20 mg tablet   0    amiodarone (CORDARONE) 200 mg tablet   0    amLODIPine (NORVASC) 10 mg tablet   1    losartan (COZAAR) 100 mg tablet   0    metoprolol succinate (TOPROL-XL) 25 mg XL tablet   0    potassium chloride (K-DUR, KLOR-CON) 20 mEq tablet   1    pravastatin (PRAVACHOL) 40 mg tablet   1    RANEXA 500 mg SR tablet       triamterene-hydrochlorothiazide (DYAZIDE) 37.5-25 mg per capsule   1    aspirin delayed-release 81 mg tablet Take  by mouth daily.  cholecalciferol (VITAMIN D3) 1,000 unit tablet Take  by mouth daily.  fluticasone (FLONASE) 50 mcg/actuation nasal spray nightly.  nitroglycerin (NITROSTAT) 0.4 mg SL tablet by SubLINGual route every five (5) minutes as needed for Chest Pain.  HYDROcodone-acetaminophen (NORCO) 5-325 mg per tablet take 1 tablet by mouth every 4 hours if needed  0    gabapentin (NEURONTIN) 300 mg capsule Take 1 Cap by mouth three (3) times daily.  (Patient taking differently: Take 300 mg by mouth two (2) times a day.) 90 Cap 1    oxyCODONE-acetaminophen (PERCOCET) 5-325 mg per tablet   0       Allergies   Allergen Reactions    Plavix [Clopidogrel] Rash          PHYSICAL EXAMINATION    Visit Vitals    /76    Pulse 80    Resp 18    Wt 156 lb (70.8 kg)    BMI 27.63 kg/m2       CONSTITUTIONAL: NAD, A&O x 3  SENSATION: Increased groin pain with internal rotation of the left hip. Intact to light touch throughout  NEURO: Oscar's is negative bilaterally. RANGE OF MOTION: Good ROM, left hip. The patient has full passive range of motion in all four extremities. MOTOR:  Straight Leg Raise: Negative, bilateral     Shoulder AB/Flex Elbow Flex Wrist Ext Elbow Ext Wrist Flex Hand Intrin Tone   Right +4/5 +4/5 +4/5 +4/5 +4/5 +4/5 +4/5   Left +4/5 +4/5 +4/5 +4/5 +4/5 +4/5 +4/5              Hip Flex Knee Ext Knee Flex Ankle DF GTE Ankle PF Tone   Right +4/5 +4/5 +4/5 +4/5 +4/5 +4/5 +4/5   Left +4/5 +4/5 +4/5 +4/5 +4/5 +4/5 +4/5       ASSESSMENT   Scott Richard was seen today for back pain. Diagnoses and all orders for this visit:    Lumbar spinal stenosis  -     DULoxetine (CYMBALTA) 20 mg capsule; Take 1 Cap by mouth daily. Indications: NEUROPATHIC PAIN  -     SCHEDULE SURGERY    Radiculopathy, lumbar region  -     DULoxetine (CYMBALTA) 20 mg capsule; Take 1 Cap by mouth daily. Indications: NEUROPATHIC PAIN  -     SCHEDULE SURGERY    Lumbar post-laminectomy syndrome  -     DULoxetine (CYMBALTA) 20 mg capsule; Take 1 Cap by mouth daily. Indications: NEUROPATHIC PAIN  -     SCHEDULE SURGERY    Spondylosis without myelopathy or radiculopathy, cervical region  -     DULoxetine (CYMBALTA) 20 mg capsule; Take 1 Cap by mouth daily. Indications: NEUROPATHIC PAIN  -     SCHEDULE SURGERY    Arachnoiditis  -     DULoxetine (CYMBALTA) 20 mg capsule; Take 1 Cap by mouth daily. Indications: NEUROPATHIC PAIN  -     SCHEDULE SURGERY          IMPRESSION AND PLAN:  She elects to proceed with lumbar blocks.  I will order L4/5 epidural. In the interim, patient will wean off Neurontin. I will try her on Cymbalta 20 mg per day. The risks, benefits, and potential side effects of this medication were discussed. Patient understands and wishes to proceed. Patient advised to call the office if intolerant to new medication. I encourage patient to perform her HEP daily. I will see the patient back following block. Written by Yang Murphy, as dictated by Reyes Horne MD  I examined the patient, reviewed and agree with the note.

## 2017-05-16 NOTE — MR AVS SNAPSHOT
Visit Information Date & Time Provider Department Dept. Phone Encounter #  
 5/16/2017  8:10 AM Karley Tovar MD [facility], [address] [facility] - [place] [id number] [id number] Follow-up Instructions Return for following block. Upcoming Health Maintenance Date Due DTaP/Tdap/Td series (1 - Tdap) 12/26/1961 ZOSTER VACCINE AGE 60> 12/26/2000 GLAUCOMA SCREENING Q2Y 12/26/2005 OSTEOPOROSIS SCREENING (DEXA) 12/26/2005 Pneumococcal 65+ Low/Medium Risk (1 of 2 - PCV13) 12/26/2005 MEDICARE YEARLY EXAM 12/26/2005 INFLUENZA AGE 9 TO ADULT 8/1/2017 Allergies as of 5/16/2017  Review Complete On: 5/16/2017 By: Karley Tovar MD  
  
 Severity Noted Reaction Type Reactions Plavix [Clopidogrel]  02/01/2016    Rash Current Immunizations  Never Reviewed No immunizations on file. Not reviewed this visit You Were Diagnosed With   
  
 Codes Comments Lumbar spinal stenosis    -  Primary ICD-10-CM: M48.06 
ICD-9-CM: 724.02 Radiculopathy, lumbar region     ICD-10-CM: M54.16 
ICD-9-CM: 724.4 Lumbar post-laminectomy syndrome     ICD-10-CM: M96.1 ICD-9-CM: 722.83 Spondylosis without myelopathy or radiculopathy, cervical region     ICD-10-CM: M47.812 ICD-9-CM: 721.0 Arachnoiditis     ICD-10-CM: G03.9 ICD-9-CM: 322.9 Vitals BP Pulse Resp Weight(growth percentile) BMI Smoking Status 119/76 80 18 156 lb (70.8 kg) 27.63 kg/m2 Former Smoker BMI and BSA Data Body Mass Index Body Surface Area  
 27.63 kg/m 2 1.77 m 2 Preferred Pharmacy Pharmacy Name Phone [facility] [address] [place] [address] [phone] Your Updated Medication List  
  
   
This list is accurate as of: 5/16/17  8:53 AM.  Always use your most recent med list.  
  
  
  
  
 amiodarone 200 mg tablet Commonly known as:  CORDARONE  
  
 amLODIPine 10 mg tablet Commonly known as:  NORVASC  
  
 aspirin delayed-release 81 mg tablet Take  by mouth daily. cholecalciferol 1,000 unit tablet Commonly known as:  VITAMIN D3 Take  by mouth daily. DULoxetine 20 mg capsule Commonly known as:  CYMBALTA Take 1 Cap by mouth daily. Indications: NEUROPATHIC PAIN  
  
 fluticasone 50 mcg/actuation nasal spray Commonly known as:  FLONASE  
nightly. furosemide 20 mg tablet Commonly known as:  LASIX  
  
 * gabapentin 300 mg capsule Commonly known as:  NEURONTIN Take 1 Cap by mouth three (3) times daily. * gabapentin 600 mg tablet Commonly known as:  NEURONTIN Take 1 Tab by mouth three (3) times daily. HYDROcodone-acetaminophen 5-325 mg per tablet Commonly known as:  Bhavik Dayton  
take 1 tablet by mouth every 4 hours if needed  
  
 losartan 100 mg tablet Commonly known as:  COZAAR  
  
 metoprolol succinate 25 mg XL tablet Commonly known as:  TOPROL-XL  
  
 naproxen 500 mg tablet Commonly known as:  NAPROSYN Take 1 Tab by mouth two (2) times daily (with meals). nitroglycerin 0.4 mg SL tablet Commonly known as:  NITROSTAT  
by SubLINGual route every five (5) minutes as needed for Chest Pain. oxyCODONE-acetaminophen 5-325 mg per tablet Commonly known as:  PERCOCET  
  
 potassium chloride 20 mEq tablet Commonly known as:  K-DUR KLOR-CON  
  
 pravastatin 40 mg tablet Commonly known as:  PRAVACHOL  
  
 RANEXA 500 mg SR tablet Generic drug:  ranolazine ER  
  
 triamterene-hydroCHLOROthiazide 37.5-25 mg per capsule Commonly known as:  Romayne Marie * Notice: This list has 2 medication(s) that are the same as other medications prescribed for you. Read the directions carefully, and ask your doctor or other care provider to review them with you. Prescriptions Sent to Pharmacy Refills DULoxetine (CYMBALTA) 20 mg capsule 1 Sig: Take 1 Cap by mouth daily.  Indications: NEUROPATHIC PAIN  
 Class: Normal  
 Pharmacy: [facility], [facility] #: [phone] Route: Oral  
  
Follow-up Instructions Return for following block. Introducing [facility] SERVICES! Raquel Kyle introduces [employer] patient portal. Now you can access parts of your medical record, email your doctor's office, and request medication refills online. 1. In your internet browser, go to [email or web address] 2. Click on the First Time User? Click Here link in the Sign In box. You will see the New Member Sign Up page. 3. Enter your [employer] Access Code exactly as it appears below. You will not need to use this code after youve completed the sign-up process. If you do not sign up before the expiration date, you must request a new code. · [employer] Access Code: [email or web address] Expires: 7/2/2017  8:15 AM 
 
4. Enter the last four digits of your Social Security Number (xxxx) and Date of Birth (mm/dd/yyyy) as indicated and click Submit. You will be taken to the next sign-up page. 5. Create a [employer] ID. This will be your [employer] login ID and cannot be changed, so think of one that is secure and easy to remember. 6. Create a [employer] password. You can change your password at any time. 7. Enter your Password Reset Question and Answer. This can be used at a later time if you forget your password. 8. Enter your e-mail address. You will receive e-mail notification when new information is available in [address] 9. Click Sign Up. You can now view and download portions of your medical record. 10. Click the Download Summary menu link to download a portable copy of your medical information. If you have questions, please visit the Frequently Asked Questions section of the [employer] website. Remember, [employer] is NOT to be used for urgent needs. For medical emergencies, dial 911. Now available from your iPhone and Android! Please provide this summary of care documentation to your next provider. Your primary care clinician is listed as Alan Arrington. If you have any questions after today's visit, please call [phone].

## 2017-05-24 DIAGNOSIS — M47.812 SPONDYLOSIS WITHOUT MYELOPATHY OR RADICULOPATHY, CERVICAL REGION: ICD-10-CM

## 2017-05-24 DIAGNOSIS — M54.16 RADICULOPATHY, LUMBAR REGION: ICD-10-CM

## 2017-05-24 DIAGNOSIS — M51.36 OTHER INTERVERTEBRAL DISC DEGENERATION, LUMBAR REGION: ICD-10-CM

## 2017-05-24 DIAGNOSIS — M54.42 LOW BACK PAIN WITH LEFT-SIDED SCIATICA, UNSPECIFIED BACK PAIN LATERALITY, UNSPECIFIED CHRONICITY: ICD-10-CM

## 2017-05-24 DIAGNOSIS — M96.1 LUMBAR POST-LAMINECTOMY SYNDROME: ICD-10-CM

## 2017-06-16 ENCOUNTER — OFFICE VISIT (OUTPATIENT)
Dept: ORTHOPEDIC SURGERY | Age: 77
End: 2017-06-16

## 2017-06-16 VITALS — RESPIRATION RATE: 12 BRPM | BODY MASS INDEX: 27.07 KG/M2 | WEIGHT: 152.8 LBS | HEIGHT: 63 IN

## 2017-06-16 DIAGNOSIS — G03.9 ARACHNOIDITIS: ICD-10-CM

## 2017-06-16 DIAGNOSIS — M48.061 LUMBAR SPINAL STENOSIS: ICD-10-CM

## 2017-06-16 DIAGNOSIS — M96.1 LUMBAR POST-LAMINECTOMY SYNDROME: Primary | ICD-10-CM

## 2017-06-16 DIAGNOSIS — M54.16 RADICULOPATHY, LUMBAR REGION: ICD-10-CM

## 2017-06-16 DIAGNOSIS — M51.36 OTHER INTERVERTEBRAL DISC DEGENERATION, LUMBAR REGION: ICD-10-CM

## 2017-06-16 RX ORDER — DULOXETIN HYDROCHLORIDE 30 MG/1
30 CAPSULE, DELAYED RELEASE ORAL DAILY
Qty: 30 CAP | Refills: 1 | Status: SHIPPED | OUTPATIENT
Start: 2017-06-16 | End: 2017-09-20 | Stop reason: SDUPTHER

## 2017-06-16 NOTE — PROGRESS NOTES
[facility]  [address], [place]   Phone: [phone]  Fax: [phone]        PROGRESS NOTE      HISTORY OF PRESENT ILLNESS:  The patient is a 68 y.o. female and was seen today for follow up of low back pain radiating into the left groin. Pt denies radicular type symptoms of the LLE at this time which previously extended posteriorly to the calf. She notes her pain is the worst in the AM. This is a patient with a diagnosis of lumbar postlaminectomy syndrome with h/o lumbar spinal surgery from ~ 2012 performed by Dr. Danial Espinosa. Prior to her surgery, she did not have much in the way of low back pain but just LLE pain. Pt underwent lumbar blocks roughly four years ago but none recently. She was initially seen for c/o intermittent right shoulder pain extending into the RUE n a C6 distribution to the 1st digit right hand, which she reports is doing well. She reports dropping objects with the right hand that has increased a little in frequency. Pt denies any recent falls. Notes from Dr. Fernanda Joyce were reviewed. Pt completed physical therapy for her cervical spine with relief and continues with her HEP. She reports previous shoulder injection without relief. Pt completed physical therapy with slight relief. Pt is completing her HEP regularly. She was treated with Percocet with minimal relief. Pt is no longer taking Aleve. Pt completed Medrol Dosepak without benefit. Pt reports nervousness with increased dose of NEURONTIN 600 mg TID. Pt denies fever, weight loss, or skin changes. Pt denies history of glaucoma, DM, or seizures. Of note, patient has a pacemaker and is not a candidate for an MRI. Cervical spine XR from 1/16/16 was reviewed. Per report there was no acute fracture or dislocation cervical spine. There was degenerative discogenic changes C4-C7, most prominent at C5-C6, slightly progressed from comparison study. Cervical spine CT from 2/10/16 was reviewed. Per report, there was multilevel central stenosis due to disc osteophyte complex involving C4-C5 through C6-C7 levels. Stenosis is greatest at C4-C5, mild to moderate in degree. Additional mild-to-moderate C4-C5 and mild C5-C6 cord deformity is present. No high-grade central stenosis is seen. Mild-to-moderate multilevel foraminal stenosis. A RUE EMG from 2/17/16 was reviewed. Per report, it was suggestive of a chronic C7 radiculopathy on the right sided. Preliminary reading of lumbar plain films revealed posterior fusion L3-L4. Hardware intact. Degenerative disc disease at L1-L2, L2-L3 and L4-L5. Anterior osteophytes L1-2-3-4-L5. No acute pathology identified. Lumbar spine CT myelogram dated 5/12/17 reviewed. Per report, multilevel degenerative disc and degenerative facet disease with postsurgical changes at L3-4. Overall, central canal stenosis appears to be mild but the severity of the stenosis does appear to slightly increase on the upright myelographic views. Multilevel foraminal narrowing is present and is severe on the left at L4-5. progressive clumping and peripheral displacement of the cauda equina nerve roots extending below the L3 level, consistent with the sequela of arachnoiditis. At her last clinical appointment, she elected to proceed with lumbar blocks. I ordered L4/5 epidural. Patient weaned off Neurontin. I tried her on Cymbalta 20 mg per day. I encouraged patient to perform her HEP daily. The patient returns today with low back pain. She denies left groin pain at this time. Her neck and RUE continue to do well. She rates pain 0-7/10, an improvement since her last visit (7/10). Pt underwent L4/5 epidural on 5/23/17 with good relief. She is tolerating Cymbalta 20 mg per day noting slight improvement in symptoms. Pt performs her HEP daily.  reviewed.        Past Medical History:   Diagnosis Date    Arrhythmia     Cardiac disease     Hypercholesteremia     Hyperlipidemia     Hypertension  Pacemaker         Social History     Social History    Marital status:      Spouse name: N/A    Number of children: N/A    Years of education: N/A     Occupational History    Not on file. Social History Main Topics    Smoking status: Former Smoker     Packs/day: 0.50     Years: 30.00     Quit date: 3/21/2009    Smokeless tobacco: Never Used    Alcohol use No    Drug use: No    Sexual activity: Not on file     Other Topics Concern    Not on file     Social History Narrative       Current Outpatient Prescriptions   Medication Sig Dispense Refill    DULoxetine (CYMBALTA) 30 mg capsule Take 1 Cap by mouth daily. 30 Cap 1    DULoxetine (CYMBALTA) 20 mg capsule Take 1 Cap by mouth daily. Indications: NEUROPATHIC PAIN 30 Cap 1    furosemide (LASIX) 20 mg tablet   0    amiodarone (CORDARONE) 200 mg tablet   0    amLODIPine (NORVASC) 10 mg tablet   1    losartan (COZAAR) 100 mg tablet   0    metoprolol succinate (TOPROL-XL) 25 mg XL tablet   0    potassium chloride (K-DUR, KLOR-CON) 20 mEq tablet   1    pravastatin (PRAVACHOL) 40 mg tablet   1    RANEXA 500 mg SR tablet       triamterene-hydrochlorothiazide (DYAZIDE) 37.5-25 mg per capsule   1    aspirin delayed-release 81 mg tablet Take  by mouth daily.  cholecalciferol (VITAMIN D3) 1,000 unit tablet Take  by mouth daily.  fluticasone (FLONASE) 50 mcg/actuation nasal spray nightly.  nitroglycerin (NITROSTAT) 0.4 mg SL tablet by SubLINGual route every five (5) minutes as needed for Chest Pain. Allergies   Allergen Reactions    Plavix [Clopidogrel] Rash          PHYSICAL EXAMINATION    Visit Vitals    Resp 12    Ht 5' 3\" (1.6 m)    Wt 152 lb 12.8 oz (69.3 kg)    BMI 27.07 kg/m2       CONSTITUTIONAL: NAD, A&O x 3  SENSATION: Intact to light touch throughout  RANGE OF MOTION: The patient has full passive range of motion in all four extremities.   MOTOR:  Straight Leg Raise: Negative, bilateral               Hip Flex Knee Ext Knee Flex Ankle DF GTE Ankle PF Tone   Right +4/5 +4/5 +4/5 +4/5 +4/5 +4/5 +4/5   Left +4/5 +4/5 +4/5 +4/5 +4/5 +4/5 +4/5       ASSESSMENT   Johnathon Snell was seen today for back pain and follow-up. Diagnoses and all orders for this visit:    Lumbar post-laminectomy syndrome    Lumbar spinal stenosis    Radiculopathy, lumbar region    Other intervertebral disc degeneration, lumbar region    Arachnoiditis    Other orders  -     DULoxetine (CYMBALTA) 30 mg capsule; Take 1 Cap by mouth daily. IMPRESSION AND PLAN:  The patient is s/P L4/5 epidural noting improvement in symptoms. I will increase her Cymbalta to 30 mg per day. Patient advised to call the office if intolerant to higher dose. I encourage patient to perform her HEP daily. I will see the patient back in 1 month's time or earlier if needed. Written by Colby Burt, as dictated by Cr Irwin MD  I examined the patient, reviewed and agree with the note.

## 2017-06-16 NOTE — MR AVS SNAPSHOT
Visit Information Date & Time Provider Department Dept. Phone Encounter #  
 6/16/2017  9:05 AM Mansi Tineo, [address] [facility] [place] [phone] [id number] Follow-up Instructions Return in about 4 weeks (around 7/14/2017). Upcoming Health Maintenance Date Due DTaP/Tdap/Td series (1 - Tdap) 12/26/1961 ZOSTER VACCINE AGE 60> 12/26/2000 GLAUCOMA SCREENING Q2Y 12/26/2005 OSTEOPOROSIS SCREENING (DEXA) 12/26/2005 Pneumococcal 65+ Low/Medium Risk (1 of 2 - PCV13) 12/26/2005 MEDICARE YEARLY EXAM 12/26/2005 INFLUENZA AGE 9 TO ADULT 8/1/2017 Allergies as of 6/16/2017  Review Complete On: 6/16/2017 By: Mansi Tineo MD  
  
 Severity Noted Reaction Type Reactions Plavix [Clopidogrel]  02/01/2016    Rash Current Immunizations  Never Reviewed No immunizations on file. Not reviewed this visit You Were Diagnosed With   
  
 Codes Comments Lumbar post-laminectomy syndrome    -  Primary ICD-10-CM: M96.1 ICD-9-CM: 722.83 Lumbar spinal stenosis     ICD-10-CM: M48.06 
ICD-9-CM: 724.02 Radiculopathy, lumbar region     ICD-10-CM: M54.16 
ICD-9-CM: 724.4 Other intervertebral disc degeneration, lumbar region     ICD-10-CM: M51.36 
ICD-9-CM: 722.52 Arachnoiditis     ICD-10-CM: G03.9 ICD-9-CM: 322.9 Vitals Resp Height(growth percentile) Weight(growth percentile) BMI Smoking Status 12 5' 3\" (1.6 m) 152 lb 12.8 oz (69.3 kg) 27.07 kg/m2 Former Smoker BMI and BSA Data Body Mass Index Body Surface Area  
 27.07 kg/m 2 1.76 m 2 Preferred Pharmacy Pharmacy Name Phone [facility] [address] [facility], [address] [place] [address] Rd [phone] Your Updated Medication List  
  
   
This list is accurate as of: 6/16/17  9:48 AM.  Always use your most recent med list.  
  
  
  
  
 amiodarone 200 mg tablet Commonly known as:  CORDARONE  
  
 amLODIPine 10 mg tablet Commonly known as:  NORVASC  
  
 aspirin delayed-release 81 mg tablet Take  by mouth daily. cholecalciferol 1,000 unit tablet Commonly known as:  VITAMIN D3 Take  by mouth daily. * DULoxetine 20 mg capsule Commonly known as:  CYMBALTA Take 1 Cap by mouth daily. Indications: NEUROPATHIC PAIN  
  
 * DULoxetine 30 mg capsule Commonly known as:  CYMBALTA Take 1 Cap by mouth daily. fluticasone 50 mcg/actuation nasal spray Commonly known as:  FLONASE  
nightly. furosemide 20 mg tablet Commonly known as:  LASIX  
  
 losartan 100 mg tablet Commonly known as:  COZAAR  
  
 metoprolol succinate 25 mg XL tablet Commonly known as:  TOPROL-XL  
  
 nitroglycerin 0.4 mg SL tablet Commonly known as:  NITROSTAT  
by SubLINGual route every five (5) minutes as needed for Chest Pain.  
  
 potassium chloride 20 mEq tablet Commonly known as:  K-DUR, KLOR-CON  
  
 pravastatin 40 mg tablet Commonly known as:  PRAVACHOL  
  
 RANEXA 500 mg SR tablet Generic drug:  ranolazine ER  
  
 triamterene-hydroCHLOROthiazide 37.5-25 mg per capsule Commonly known as:  Suha Mendoza * Notice: This list has 2 medication(s) that are the same as other medications prescribed for you. Read the directions carefully, and ask your doctor or other care provider to review them with you. Prescriptions Sent to Pharmacy Refills DULoxetine (CYMBALTA) 30 mg capsule 1 Sig: Take 1 Cap by mouth daily. Class: Normal  
 Pharmacy: [address] [place] [address] #: [phone] Route: Oral  
  
Follow-up Instructions Return in about 4 weeks (around 7/14/2017). Introducing [facility]! Olaf Trevizo introduces [employer] patient portal. Now you can access parts of your medical record, email your doctor's office, and request medication refills online.    
 
1. In your internet browser, go to [email or web address] 2. Click on the First Time User? Click Here link in the Sign In box. You will see the New Member Sign Up page. 3. Enter your EdSurge Access Code exactly as it appears below. You will not need to use this code after youve completed the sign-up process. If you do not sign up before the expiration date, you must request a new code. · [employer]ge Access Code: [email or web address] Expires: 7/2/2017  8:15 AM 
 
4. Enter the last four digits of your Social Security Number (xxxx) and Date of Birth (mm/dd/yyyy) as indicated and click Submit. You will be taken to the next sign-up page. 5. Create a [employer] ID. This will be your EdSurge login ID and cannot be changed, so think of one that is secure and easy to remember. 6. Create a Ed[employer]ge password. You can change your password at any time. 7. Enter your Password Reset Question and Answer. This can be used at a later time if you forget your password. 8. Enter your e-mail address. You will receive e-mail notification when new information is available in [address] 9. Click Sign Up. You can now view and download portions of your medical record. 10. Click the Download Summary menu link to download a portable copy of your medical information. If you have questions, please visit the Frequently Asked Questions section of the EdSurge website. Remember, EdSurge is NOT to be used for urgent needs. For medical emergencies, dial 911. Now available from your iPhone and Android! Please provide this summary of care documentation to your next provider. Your primary care clinician is listed as Jesu Oliver. If you have any questions after today's visit, please call [phone].

## 2017-09-12 NOTE — TELEPHONE ENCOUNTER
Last Visit: 06/16/2017 with MD Renee Pham    Next Appointment: Office Reschedule 07/19 & 07/24; No show 08/21  Previous Refill Encounters: 06/16/2017 per MD Renee Pham #[id number] with 1 refill     Requested Prescriptions     Pending Prescriptions Disp Refills    DULoxetine (CYMBALTA) 30 mg capsule 30 Cap 0     Sig: Take 1 Cap by mouth daily.

## 2017-09-13 RX ORDER — DULOXETIN HYDROCHLORIDE 30 MG/1
30 CAPSULE, DELAYED RELEASE ORAL DAILY
Qty: 30 CAP | Refills: 0 | OUTPATIENT
Start: 2017-09-13

## 2017-09-13 NOTE — TELEPHONE ENCOUNTER
Please contact the patient for scheduling per refusal reason below. Thanks.      Refused by: Abe Rosa MD  Refusal reason: Appt required, please call patient

## 2017-09-20 ENCOUNTER — OFFICE VISIT (OUTPATIENT)
Dept: ORTHOPEDIC SURGERY | Age: 77
End: 2017-09-20

## 2017-09-20 VITALS
WEIGHT: 150.2 LBS | RESPIRATION RATE: 16 BRPM | SYSTOLIC BLOOD PRESSURE: 114 MMHG | HEART RATE: 67 BPM | HEIGHT: 63 IN | BODY MASS INDEX: 26.61 KG/M2 | DIASTOLIC BLOOD PRESSURE: 63 MMHG

## 2017-09-20 DIAGNOSIS — M48.061 LUMBAR SPINAL STENOSIS: ICD-10-CM

## 2017-09-20 DIAGNOSIS — M96.1 LUMBAR POST-LAMINECTOMY SYNDROME: ICD-10-CM

## 2017-09-20 DIAGNOSIS — M54.42 LOW BACK PAIN WITH LEFT-SIDED SCIATICA, UNSPECIFIED BACK PAIN LATERALITY, UNSPECIFIED CHRONICITY: Primary | ICD-10-CM

## 2017-09-20 RX ORDER — NAPROXEN 500 MG/1
TABLET ORAL
Refills: 0 | COMMUNITY
Start: 2017-09-14

## 2017-09-20 RX ORDER — DULOXETIN HYDROCHLORIDE 30 MG/1
30 CAPSULE, DELAYED RELEASE ORAL DAILY
Qty: 30 CAP | Refills: 5 | Status: SHIPPED | OUTPATIENT
Start: 2017-09-20 | End: 2018-01-30 | Stop reason: SDUPTHER

## 2017-09-20 RX ORDER — BENZONATATE 100 MG/1
CAPSULE ORAL
Refills: 0 | COMMUNITY
Start: 2017-09-14 | End: 2018-05-15 | Stop reason: ALTCHOICE

## 2017-09-20 NOTE — PROGRESS NOTES
Chief complaint/History of Present Illness:  Chief Complaint   Patient presents with    Back Pain     Follow up and med refill     HPI  Isai Larsen is a  68 y.o.  female      HISTORY OF PRESENT ILLNESS:  The patient comes in today for followup of her low back pain. She gets pain into the left lower extremity occasionally. She states the lumbar epidural at L4-L5 done May 23, 2017, helped her a lot but over the last month, her pain has started to come back, and she would like to have the epidural repeated. She is also on Cymbalta 30 mg daily. She ran out last Friday. She is not having any side effects from coming off of it, but states she does feel like it does help when she is on it. She denies fever or bowel or bladder dysfunction. PHYSICAL EXAM:  Ms. Ozzie Hamilton is a 68-year-old female. She is alert and oriented. She has a full weight bearing, non-antalgic gait. She has 4/5 strength of the bilateral lower extremities and negative straight leg raise. She does have pain with hyperextension of the lumbar spine. ASSESSENT/PLAN:  This is a patient who has lumbar spinal stenosis, post lumbar laminectomy syndrome, some low back pain with left-sided sciatica. We are going to go ahead and repeat the L4-L5 epidurals. She knows she can only get three in a years time. We have refilled her Cymbalta. We will see her back after the block.         Review of systems:    Past Medical History:   Diagnosis Date    Arrhythmia     Cardiac disease     Hypercholesteremia     Hyperlipidemia     Hypertension     Pacemaker      Past Surgical History:   Procedure Laterality Date    BREAST SURGERY PROCEDURE UNLISTED Bilateral     bilateral breast reducution post procedure orderset 688 cyst right breast removed    CARDIAC SURG PROCEDURE UNLIST      coronary artery bypass graft, coronary stent placement     Social History     Social History    Marital status:      Spouse name: N/A    Number of children: N/A  Years of education: N/A     Occupational History    Not on file. Social History Main Topics    Smoking status: Former Smoker     Packs/day: 0.50     Years: 30.00     Quit date: 3/21/2009    Smokeless tobacco: Never Used    Alcohol use No    Drug use: No    Sexual activity: Not on file     Other Topics Concern    Not on file     Social History Narrative     Family History   Problem Relation Age of Onset    Hypertension Mother     Coronary Artery Disease Mother     Hypertension Father     Coronary Artery Disease Brother     Stroke Maternal Grandmother        Physical Exam:  Visit Vitals    /63    Pulse 67    Resp 16    Ht 5' 3\" (1.6 m)    Wt 150 lb 3.2 oz (68.1 kg)    BMI 26.61 kg/m2     Pain Scale: 5/10     has been . reviewed and is appropriate        Diagnoses and all orders for this visit:    1. Low back pain with left-sided sciatica, unspecified back pain laterality, unspecified chronicity  -     DULoxetine (CYMBALTA) 30 mg capsule; Take 1 Cap by mouth daily.  -     SCHEDULE SURGERY    2. Lumbar post-laminectomy syndrome  -     DULoxetine (CYMBALTA) 30 mg capsule; Take 1 Cap by mouth daily.  -     SCHEDULE SURGERY    3. Lumbar spinal stenosis  -     DULoxetine (CYMBALTA) 30 mg capsule; Take 1 Cap by mouth daily.  -     SCHEDULE SURGERY            Follow-up Disposition:  Return for after block.         We have informed Gume Chatman to notify us for immediate appointment if she has any worsening neurogical symptoms or if an emergency situation presents, then call 911

## 2017-10-18 ENCOUNTER — OFFICE VISIT (OUTPATIENT)
Dept: ORTHOPEDIC SURGERY | Age: 77
End: 2017-10-18

## 2017-10-18 VITALS
BODY MASS INDEX: 27.61 KG/M2 | HEART RATE: 70 BPM | HEIGHT: 63 IN | RESPIRATION RATE: 16 BRPM | DIASTOLIC BLOOD PRESSURE: 66 MMHG | WEIGHT: 155.8 LBS | SYSTOLIC BLOOD PRESSURE: 136 MMHG

## 2017-10-18 DIAGNOSIS — M48.062 SPINAL STENOSIS OF LUMBAR REGION WITH NEUROGENIC CLAUDICATION: Primary | ICD-10-CM

## 2017-10-18 DIAGNOSIS — M54.42 LOW BACK PAIN WITH LEFT-SIDED SCIATICA, UNSPECIFIED BACK PAIN LATERALITY, UNSPECIFIED CHRONICITY: ICD-10-CM

## 2017-10-18 DIAGNOSIS — M96.1 LUMBAR POST-LAMINECTOMY SYNDROME: ICD-10-CM

## 2017-10-18 RX ORDER — CREAM BASE NO.171
240 CREAM (GRAM) MISCELLANEOUS 4 TIMES DAILY
Qty: 240 G | Refills: 3 | Status: SHIPPED | OUTPATIENT
Start: 2017-10-18 | End: 2021-08-19

## 2017-10-18 NOTE — PROGRESS NOTES
Chief complaint/History of Present Illness:  Chief Complaint   Patient presents with    Back Pain     Follow up block injection    Leg Pain     LLSHARON Storm is a  68 y.o.  female      HISTORY OF PRESENT ILLNESS:  The patient comes in today following her L4-5 lumbar epidural done on October 6, 2017. She states it did help a little bit but not as much as previous ones. Cymbalta 30 mg daily controls her left lower extremity pain. Her main complaint is low back pain. She states it is very stiff in the morning and gets better as the day goes on. She rates it as a 5/10 now. She does take occasional Naprosyn through her PCP. Most of the time she will take it at night and then in the morning she feels better and is able to move around, but she does not like to take it very often. She is a nonsmoker. She is retired. She denies fever or bowel or bladder dysfunction. She has had lumbar surgery. She states it was a fusion 10 years ago by Dr. Santa Hernandez. PHYSICAL EXAM:  Ms. Ugo Mckeon is a 66-year-old female. She is alert and oriented. Affect is normal.  She has a normal mood and affect. She has a full weight bearing, non-antalgic gait. She has pain with hyperextension of the lumbar spine and 4/5 strength of the bilateral lower extremities. She has a negative straight leg raise. ASSESSENT/PLAN:  This is a patient with post-lumbar laminectomy syndrome and lumbar spinal stenosis that gives her low back pain with left-sided sciatica. Her sciatica is controlled with Cymbalta 30 mg. We will continue that. I am going to try a compounded cream with an anti-inflammatory property for her low back pain. I have sent that to [facility]. They should contact her and send it to her in the mail. She has a  at home that can rub it into her back. She has enough Cymbalta. We will see her back in three months or sooner if needed.         Review of systems:    Past Medical History:   Diagnosis Date    Arrhythmia     Cardiac disease     Hypercholesteremia     Hyperlipidemia     Hypertension     Pacemaker      Past Surgical History:   Procedure Laterality Date    BREAST SURGERY PROCEDURE UNLISTED Bilateral     bilateral breast reducution post procedure orderset 688 cyst right breast removed    CARDIAC SURG PROCEDURE UNLIST      coronary artery bypass graft, coronary stent placement     Social History     Social History    Marital status:      Spouse name: N/A    Number of children: N/A    Years of education: N/A     Occupational History    Not on file. Social History Main Topics    Smoking status: Former Smoker     Packs/day: 0.50     Years: 30.00     Quit date: 3/21/2009    Smokeless tobacco: Never Used    Alcohol use No    Drug use: No    Sexual activity: Not on file     Other Topics Concern    Not on file     Social History Narrative     Family History   Problem Relation Age of Onset    Hypertension Mother     Coronary Artery Disease Mother     Hypertension Father     Coronary Artery Disease Brother     Stroke Maternal Grandmother        Physical Exam:  Visit Vitals    /66    Pulse 70    Resp 16    Ht 5' 3\" (1.6 m)    Wt 155 lb 12.8 oz (70.7 kg)    BMI 27.6 kg/m2     Pain Scale: 5/10     has been . reviewed and is appropriate        Diagnoses and all orders for this visit:    1. Spinal stenosis of lumbar region with neurogenic claudication    2. Low back pain with left-sided sciatica, unspecified back pain laterality, unspecified chronicity    3. Lumbar post-laminectomy syndrome    Other orders  -     COMPOUNDMAX BASE crea; 240 g by Does Not Apply route four (4) times daily. Anti-Inflam Diclofenac Sodium 3%Gabapentin 3% Lidocaine 2% Prilocaine 2%            Follow-up Disposition:  Return in about 3 months (around 1/18/2018) for with Lenore.         We have informed Liz Smith to notify us for immediate appointment if she has any worsening neurogical symptoms or if an emergency situation presents, then call 911

## 2017-10-18 NOTE — PATIENT INSTRUCTIONS
Back Pain: Care Instructions  Your Care Instructions    Back pain has many possible causes. It is often related to problems with muscles and ligaments of the back. It may also be related to problems with the nerves, discs, or bones of the back. Moving, lifting, standing, sitting, or sleeping in an awkward way can strain the back. Sometimes you don't notice the injury until later. Arthritis is another common cause of back pain. Although it may hurt a lot, back pain usually improves on its own within several weeks. Most people recover in 12 weeks or less. Using good home treatment and being careful not to stress your back can help you feel better sooner. Follow-up care is a key part of your treatment and safety. Be sure to make and go to all appointments, and call your doctor if you are having problems. Its also a good idea to know your test results and keep a list of the medicines you take. How can you care for yourself at home? · Sit or lie in positions that are most comfortable and reduce your pain. Try one of these positions when you lie down:  ¨ Lie on your back with your knees bent and supported by large pillows. ¨ Lie on the floor with your legs on the seat of a sofa or chair. Ralene Vinita on your side with your knees and hips bent and a pillow between your legs. ¨ Lie on your stomach if it does not make pain worse. · Do not sit up in bed, and avoid soft couches and twisted positions. Bed rest can help relieve pain at first, but it delays healing. Avoid bed rest after the first day of back pain. · Change positions every 30 minutes. If you must sit for long periods of time, take breaks from sitting. Get up and walk around, or lie in a comfortable position. · Try using a heating pad on a low or medium setting for 15 to 20 minutes every 2 or 3 hours. Try a warm shower in place of one session with the heating pad. · You can also try an ice pack for 10 to 15 minutes every 2 to 3 hours.  Put a thin cloth between the ice pack and your skin. · Take pain medicines exactly as directed. ¨ If the doctor gave you a prescription medicine for pain, take it as prescribed. ¨ If you are not taking a prescription pain medicine, ask your doctor if you can take an over-the-counter medicine. · Take short walks several times a day. You can start with 5 to 10 minutes, 3 or 4 times a day, and work up to longer walks. Walk on level surfaces and avoid hills and stairs until your back is better. · Return to work and other activities as soon as you can. Continued rest without activity is usually not good for your back. · To prevent future back pain, do exercises to stretch and strengthen your back and stomach. Learn how to use good posture, safe lifting techniques, and proper body mechanics. When should you call for help? Call your doctor now or seek immediate medical care if:  · You have new or worsening numbness in your legs. · You have new or worsening weakness in your legs. (This could make it hard to stand up.)  · You lose control of your bladder or bowels. Watch closely for changes in your health, and be sure to contact your doctor if:  · Your pain gets worse. · You are not getting better after 2 weeks. Where can you learn more? Go to http://camille-albaro.info/. Enter V620 in the search box to learn more about \"Back Pain: Care Instructions. \"  Current as of: March 21, 2017  Content Version: 11.3  © 6225-9395 [employer]. Care instructions adapted under license by [employer] (which disclaims liability or warranty for this information). If you have questions about a medical condition or this instruction, always ask your healthcare professional. [address] any warranty or liability for your use of this information.

## 2018-01-30 ENCOUNTER — OFFICE VISIT (OUTPATIENT)
Dept: ORTHOPEDIC SURGERY | Age: 78
End: 2018-01-30

## 2018-01-30 VITALS
HEART RATE: 80 BPM | DIASTOLIC BLOOD PRESSURE: 62 MMHG | HEIGHT: 63 IN | SYSTOLIC BLOOD PRESSURE: 124 MMHG | RESPIRATION RATE: 14 BRPM | BODY MASS INDEX: 26.44 KG/M2 | WEIGHT: 149.2 LBS

## 2018-01-30 DIAGNOSIS — M54.40 LOW BACK PAIN WITH SCIATICA, SCIATICA LATERALITY UNSPECIFIED, UNSPECIFIED BACK PAIN LATERALITY, UNSPECIFIED CHRONICITY: ICD-10-CM

## 2018-01-30 DIAGNOSIS — M96.1 LUMBAR POST-LAMINECTOMY SYNDROME: Primary | ICD-10-CM

## 2018-01-30 DIAGNOSIS — M48.062 SPINAL STENOSIS OF LUMBAR REGION WITH NEUROGENIC CLAUDICATION: ICD-10-CM

## 2018-01-30 DIAGNOSIS — M54.42 LOW BACK PAIN WITH LEFT-SIDED SCIATICA, UNSPECIFIED BACK PAIN LATERALITY, UNSPECIFIED CHRONICITY: ICD-10-CM

## 2018-01-30 RX ORDER — DULOXETIN HYDROCHLORIDE 30 MG/1
30 CAPSULE, DELAYED RELEASE ORAL DAILY
Qty: 90 CAP | Refills: 1 | Status: SHIPPED | OUTPATIENT
Start: 2018-01-30 | End: 2019-07-15 | Stop reason: SDUPTHER

## 2018-01-30 NOTE — MR AVS SNAPSHOT
[address] [facility] 
 
 
 [address] [facility] 
[phone] Patient: Yumiko Jarrell MRN: [id number] SBW:[id number] Visit Information Date & Time Provider Department Dept. Phone Encounter #  
 1/30/2018  8:30 AM Alanna Gagnon MD [facility] [place] [phone] [id number] Follow-up Instructions Return in about 6 months (around 7/30/2018). Upcoming Health Maintenance Date Due DTaP/Tdap/Td series (1 - Tdap) 12/26/1961 ZOSTER VACCINE AGE 60> 10/26/2000 GLAUCOMA SCREENING Q2Y 12/26/2005 OSTEOPOROSIS SCREENING (DEXA) 12/26/2005 Pneumococcal 65+ Low/Medium Risk (1 of 2 - PCV13) 12/26/2005 MEDICARE YEARLY EXAM 12/26/2005 Influenza Age 5 to Adult 8/1/2017 Allergies as of 1/30/2018  Review Complete On: 1/30/2018 By: Alanna Gagnon MD  
  
 Severity Noted Reaction Type Reactions Plavix [Clopidogrel]  02/01/2016    Rash Current Immunizations  Never Reviewed No immunizations on file. Not reviewed this visit You Were Diagnosed With   
  
 Codes Comments Lumbar post-laminectomy syndrome    -  Primary ICD-10-CM: M96.1 ICD-9-CM: 722.83 Spinal stenosis of lumbar region with neurogenic claudication     ICD-10-CM: M48.062 
ICD-9-CM: 724.03 Low back pain with left-sided sciatica, unspecified back pain laterality, unspecified chronicity     ICD-10-CM: M54.42 
ICD-9-CM: 724.3 Vitals BP Pulse Resp Height(growth percentile) Weight(growth percentile) BMI  
 124/62 80 14 5' 3\" (1.6 m) 149 lb 3.2 oz (67.7 kg) 26.43 kg/m2 Smoking Status Former Smoker BMI and BSA Data Body Mass Index Body Surface Area  
 26.43 kg/m 2 1.73 m 2 Preferred Pharmacy Pharmacy Name Phone [facility] [address] [phone] Your Updated Medication List  
  
   
 This list is accurate as of: 1/30/18  8:48 AM.  Always use your most recent med list.  
  
  
  
  
 amiodarone 200 mg tablet Commonly known as:  CORDARONE Take 200 mg by mouth daily. amLODIPine 10 mg tablet Commonly known as:  Alexy Pruitt Take 10 mg by mouth daily. aspirin delayed-release 81 mg tablet Take  by mouth daily. benzonatate 100 mg capsule Commonly known as:  TESSALON  
TAKE 1 CAPSULE BY MOUTH 3 TIMES A DAY AS NEEDED. SWALLOW PILLS WHOLE  
  
 cholecalciferol 1,000 unit tablet Commonly known as:  VITAMIN D3 Take  by mouth daily. COMPOUNDMAX BASE Crea Generic drug:  cream base no. 171 (bulk) 240 g by Does Not Apply route four (4) times daily. Anti-Inflam Diclofenac Sodium 3%Gabapentin 3% Lidocaine 2% Prilocaine 2% DULoxetine 30 mg capsule Commonly known as:  CYMBALTA Take 1 Cap by mouth daily. Indications: NEUROPATHIC PAIN  
  
 fluticasone 50 mcg/actuation nasal spray Commonly known as:  [place] Goody 2 Sprays by Both Nostrils route daily. furosemide 20 mg tablet Commonly known as:  LASIX Take 20 mg by mouth daily as needed. losartan 100 mg tablet Commonly known as:  COZAAR Take 100 mg by mouth daily. metoprolol succinate 25 mg XL tablet Commonly known as:  TOPROL-XL Take 25 mg by mouth daily. naproxen 500 mg tablet Commonly known as:  NAPROSYN  
take 1 tablet by mouth twice a day if needed for pain  
  
 nitroglycerin 0.4 mg SL tablet Commonly known as:  NITROSTAT  
by SubLINGual route every five (5) minutes as needed for Chest Pain.  
  
 potassium chloride 20 mEq tablet Commonly known as:  K-DUR, KLOR-CON Take 20 mEq by mouth daily as needed. pravastatin 40 mg tablet Commonly known as:  PRAVACHOL Take 40 mg by mouth daily. RANEXA 500 mg SR tablet Generic drug:  ranolazine ER Take 500 mg by mouth daily. triamterene-hydroCHLOROthiazide 37.5-25 mg per capsule Commonly known as:  iLam Jaffe  
 Take 1 Cap by mouth every morning. Prescriptions Sent to Pharmacy Refills DULoxetine (CYMBALTA) 30 mg capsule 1 Sig: Take 1 Cap by mouth daily. Indications: NEUROPATHIC PAIN Class: Normal  
 Pharmacy: [address] [place] [address], [address] [facility] #: [phone] Route: Oral  
  
Follow-up Instructions Return in about 6 months (around 7/30/2018). Introducing [facility]! [facility] introduces MEDEM patient portal. Now you can access parts of your medical record, email your doctor's office, and request medication refills online. 1. In your internet browser, go to [email or web address] 2. Click on the First Time User? Click Here link in the Sign In box. You will see the New Member Sign Up page. 3. Enter your MEDEM Access Code exactly as it appears below. You will not need to use this code after youve completed the sign-up process. If you do not sign up before the expiration date, you must request a new code. · MEDEM Access Code: [email or web address] Expires: 4/22/2018 12:02 PM 
 
4. Enter the last four digits of your Social Security Number (xxxx) and Date of Birth (mm/dd/yyyy) as indicated and click Submit. You will be taken to the next sign-up page. 5. Create a MEDEM ID. This will be your MEDEM login ID and cannot be changed, so think of one that is secure and easy to remember. 6. Create a MEDEM password. You can change your password at any time. 7. Enter your Password Reset Question and Answer. This can be used at a later time if you forget your password. 8. Enter your e-mail address. You will receive e-mail notification when new information is available in [address] 9. Click Sign Up. You can now view and download portions of your medical record. 10. Click the Download Summary menu link to download a portable copy of your medical information. If you have questions, please visit the Frequently Asked Questions section of the [employer] website. Remember, [employer] is NOT to be used for urgent needs. For medical emergencies, dial 911. Now available from your iPhone and Android! Please provide this summary of care documentation to your next provider. Your primary care clinician is listed as Marshal Blevins. If you have any questions after today's visit, please call [phone].

## 2018-01-30 NOTE — PROGRESS NOTES
[facility]  [address], [place]   Phone: [phone]  Fax: [phone]        PROGRESS NOTE      HISTORY OF PRESENT ILLNESS:  The patient is a 68 y.o. female and was seen today for follow up of low back pain. She denies left groin pain at this time. Pt denies radicular type symptoms of the LLE at this time which previously extended posteriorly to the calf. She notes her pain is the worst in the AM. Her neck and RUE continue to do well. This is a patient with a diagnosis of lumbar postlaminectomy syndrome with h/o lumbar spinal surgery from ~ 2012 performed by Dr. Nuñez. Prior to her surgery, she did not have much in the way of low back pain but just LLE pain. Pt underwent lumbar blocks roughly four years ago but none recently. She was initially seen for c/o intermittent right shoulder pain extending into the RUE n a C6 distribution to the 1st digit right hand, which she reports is doing well. Pt denies any recent falls. Notes from Dr. Elias Andrews were reviewed. Pt completed physical therapy for her cervical spine with relief and continues with her HEP. She reports previous shoulder injection without relief. Pt completed physical therapy with slight relief. Pt is completing her HEP regularly. She was treated with Percocet with minimal relief. Pt is no longer taking Aleve. Pt completed Medrol Dosepak without benefit. Pt reports nervousness with increased dose of NEURONTIN 600 mg TID. Pt underwent L4/5 epidural on 5/23/17 with good relief. Pt denies fever, weight loss, or skin changes. Pt denies history of glaucoma, DM, or seizures. Of note, patient has a pacemaker and is not a candidate for an MRI. Cervical spine XR from 1/16/16 was reviewed. Per report there was no acute fracture or dislocation cervical spine. There was degenerative discogenic changes C4-C7, most prominent at C5-C6, slightly progressed from comparison study.  Cervical spine CT from 2/10/16 was reviewed. Per report, there was multilevel central stenosis due to disc osteophyte complex involving C4-C5 through C6-C7 levels. Stenosis is greatest at C4-C5, mild to moderate in degree. Additional mild-to-moderate C4-C5 and mild C5-C6 cord deformity is present. No high-grade central stenosis is seen. Mild-to-moderate multilevel foraminal stenosis. A RUE EMG from 2/17/16 was reviewed. Per report, it was suggestive of a chronic C7 radiculopathy on the right sided. Preliminary reading of lumbar plain films revealed posterior fusion L3-L4. Hardware intact. Degenerative disc disease at L1-L2, L2-L3 and L4-L5. Anterior osteophytes L1-2-3-4-L5. No acute pathology identified. Lumbar spine CT myelogram dated 5/12/17 reviewed. Per report, multilevel degenerative disc and degenerative facet disease with postsurgical changes at L3-4. Overall, central canal stenosis appears to be mild but the severity of the stenosis does appear to slightly increase on the upright myelographic views. Multilevel foraminal narrowing is present and is severe on the left at L4-5. progressive clumping and peripheral displacement of the cauda equina nerve roots extending below the L3 level, consistent with the sequela of arachnoiditis. At her last clinical appointment, the patient was s/p L4/5 epidural noting improvement in symptoms. I increased her Cymbalta to 30 mg per day. I encouraged patient to perform her HEP daily. The patient returns today with low back pain. She states her extremity pain continues to do well. She rates pain 0-5/10, a decrease since her last visit (0-7/10). Her pain is not positional. Patient tolerates Cymbalta 30 mg every day well with benefit. Patient continues performing her HEP daily. Last seen by me on 6/16/17. She more recently saw Wes Hernandez NP. Note from Wes Hernandez NP dated 10/18/17 indicating patient was seen with c/o L4-5 lumbar epidural done on October 6, 2017.   She states it did help a little bit but not as much as previous ones. At that time she was given a compound cream and was scheduled to f/u in 3 months.  reviewed. Body mass index is 26.43 kg/(m^2). Past Medical History:   Diagnosis Date    Arrhythmia     Cardiac disease     Hypercholesteremia     Hyperlipidemia     Hypertension     Pacemaker         Social History     Social History    Marital status:      Spouse name: N/A    Number of children: N/A    Years of education: N/A     Occupational History    Not on file. Social History Main Topics    Smoking status: Former Smoker     Packs/day: 0.50     Years: 30.00     Quit date: 3/21/2009    Smokeless tobacco: Never Used    Alcohol use No    Drug use: No    Sexual activity: Not on file     Other Topics Concern    Not on file     Social History Narrative       Current Outpatient Prescriptions   Medication Sig Dispense Refill    DULoxetine (CYMBALTA) 30 mg capsule Take 1 Cap by mouth daily. Indications: NEUROPATHIC PAIN 90 Cap 1    COMPOUNDMAX BASE crea 240 g by Does Not Apply route four (4) times daily. Anti-Inflam Diclofenac Sodium 3%Gabapentin 3% Lidocaine 2% Prilocaine 2% 240 g 3    benzonatate (TESSALON) 100 mg capsule TAKE 1 CAPSULE BY MOUTH 3 TIMES A DAY AS NEEDED. SWALLOW PILLS WHOLE  0    naproxen (NAPROSYN) 500 mg tablet take 1 tablet by mouth twice a day if needed for pain  0    furosemide (LASIX) 20 mg tablet Take 20 mg by mouth daily as needed. 0    amiodarone (CORDARONE) 200 mg tablet Take 200 mg by mouth daily. 0    amLODIPine (NORVASC) 10 mg tablet Take 10 mg by mouth daily. 1    losartan (COZAAR) 100 mg tablet Take 100 mg by mouth daily. 0    metoprolol succinate (TOPROL-XL) 25 mg XL tablet Take 25 mg by mouth daily. 0    potassium chloride (K-DUR, KLOR-CON) 20 mEq tablet Take 20 mEq by mouth daily as needed. 1    pravastatin (PRAVACHOL) 40 mg tablet Take 40 mg by mouth daily. 1    RANEXA 500 mg SR tablet Take 500 mg by mouth daily.  aspirin delayed-release 81 mg tablet Take  by mouth daily.  cholecalciferol (VITAMIN D3) 1,000 unit tablet Take  by mouth daily.  fluticasone (FLONASE) 50 mcg/actuation nasal spray 2 Sprays by Both Nostrils route daily.  nitroglycerin (NITROSTAT) 0.4 mg SL tablet by SubLINGual route every five (5) minutes as needed for Chest Pain.  triamterene-hydrochlorothiazide (DYAZIDE) 37.5-25 mg per capsule Take 1 Cap by mouth every morning. 1       Allergies   Allergen Reactions    Plavix [Clopidogrel] Rash          PHYSICAL EXAMINATION    Visit Vitals    /62    Pulse 80    Resp 14    Ht 5' 3\" (1.6 m)    Wt 149 lb 3.2 oz (67.7 kg)    BMI 26.43 kg/m2       CONSTITUTIONAL: NAD, A&O x 3  SENSATION: Intact to light touch throughout  NEURO: Oscar's is negative bilaterally. RANGE OF MOTION: The patient has full passive range of motion in all four extremities. MOTOR:  Straight Leg Raise: Negative, bilateral     Shoulder AB/Flex Elbow Flex Wrist Ext Elbow Ext Wrist Flex Hand Intrin Tone   Right +4/5 +4/5 +4/5 +4/5 +4/5 +4/5 +4/5   Left +4/5 +4/5 +4/5 +4/5 +4/5 +4/5 +4/5              Hip Flex Knee Ext Knee Flex Ankle DF GTE Ankle PF Tone   Right +4/5 +4/5 +4/5 +4/5 +4/5 +4/5 +4/5   Left +4/5 +4/5 +4/5 +4/5 +4/5 +4/5 +4/5       ASSESSMENT   Diagnoses and all orders for this visit:    1. Lumbar post-laminectomy syndrome  -     DULoxetine (CYMBALTA) 30 mg capsule; Take 1 Cap by mouth daily. Indications: NEUROPATHIC PAIN    2. Spinal stenosis of lumbar region with neurogenic claudication    3. Low back pain with left-sided sciatica, unspecified back pain laterality, unspecified chronicity  -     DULoxetine (CYMBALTA) 30 mg capsule; Take 1 Cap by mouth daily. Indications: NEUROPATHIC PAIN    4. Low back pain with sciatica, sciatica laterality unspecified, unspecified back pain laterality, unspecified chronicity    5.  Body mass index 27.0-27.9, adult          IMPRESSION AND PLAN:  Patient's left lower extremity appears to be doing well. She continues to endorse lumbar spine pain. Patient is neurologically intact. Patient wished to continue her current treatment. I will refill her Cymbalta 30 mg every day. I encourage patient to perform her HEP daily. I will see the patient back in 6 month's time or earlier if needed. Written by Marina Alvarez, as dictated by Kathy Rico MD  I examined the patient, reviewed and agree with the note.

## 2018-05-15 ENCOUNTER — OFFICE VISIT (OUTPATIENT)
Dept: ORTHOPEDIC SURGERY | Age: 78
End: 2018-05-15

## 2018-05-15 VITALS
DIASTOLIC BLOOD PRESSURE: 64 MMHG | HEIGHT: 63 IN | WEIGHT: 154 LBS | SYSTOLIC BLOOD PRESSURE: 133 MMHG | HEART RATE: 84 BPM | BODY MASS INDEX: 27.29 KG/M2

## 2018-05-15 DIAGNOSIS — M54.16 RADICULOPATHY, LUMBAR REGION: ICD-10-CM

## 2018-05-15 DIAGNOSIS — M51.36 OTHER INTERVERTEBRAL DISC DEGENERATION, LUMBAR REGION: ICD-10-CM

## 2018-05-15 DIAGNOSIS — M48.062 SPINAL STENOSIS OF LUMBAR REGION WITH NEUROGENIC CLAUDICATION: ICD-10-CM

## 2018-05-15 DIAGNOSIS — M96.1 LUMBAR POST-LAMINECTOMY SYNDROME: Primary | ICD-10-CM

## 2018-05-15 NOTE — PROGRESS NOTES
[facility]  [address], [address]  Phone: [phone]  Fax: [phone]        PROGRESS NOTE      HISTORY OF PRESENT ILLNESS:  The patient is a 68 y.o. female and was seen today for follow up of low back pain. She denies left groin pain at this time. Pt denies radicular type symptoms of the LLE at this time which previously extended posteriorly to the calf. She notes her pain is the worst in the AM. Her neck and RUE continue to do well. This is a patient with a diagnosis of lumbar postlaminectomy syndrome with h/o lumbar spinal surgery from ~ 2012 performed by Dr. Henry Gomes. Prior to her surgery, she did not have much in the way of low back pain but just LLE pain. Pt underwent lumbar blocks roughly four years ago but none recently. She was initially seen for c/o intermittent right shoulder pain extending into the RUE n a C6 distribution to the 1st digit right hand, which she reports is doing well. Pt denies any recent falls. Notes from Dr. Debbie Mccauley were reviewed. Pt completed physical therapy for her cervical spine with relief and continues with her HEP. She reports previous shoulder injection without relief. Pt completed physical therapy with slight relief. Pt is completing her HEP regularly. She was treated with Percocet with minimal relief. Pt is no longer taking Aleve. Pt completed Medrol Dosepak without benefit. Pt reports nervousness with increased dose of NEURONTIN 600 mg TID. Pt underwent L4/5 epidural on 5/23/17 with good relief. Pt underwent L4/5 lumbar epidural on 10/6/17 with mild relief. Pt denies fever, weight loss, or skin changes. Pt denies history of glaucoma, DM, or seizures. Of note, patient has a pacemaker and is not a candidate for an MRI. Cervical spine XR from 1/16/16 was reviewed. Per report there was no acute fracture or dislocation cervical spine.  There was degenerative discogenic changes C4-C7, most prominent at C5-C6, slightly progressed from comparison study. Cervical spine CT from 2/10/16 was reviewed. Per report, there was multilevel central stenosis due to disc osteophyte complex involving C4-C5 through C6-C7 levels. Stenosis is greatest at C4-C5, mild to moderate in degree. Additional mild-to-moderate C4-C5 and mild C5-C6 cord deformity is present. No high-grade central stenosis is seen. Mild-to-moderate multilevel foraminal stenosis. A RUE EMG from 2/17/16 was reviewed. Per report, it was suggestive of a chronic C7 radiculopathy on the right sided. Preliminary reading of lumbar plain films revealed posterior fusion L3-L4. Hardware intact. Degenerative disc disease at L1-L2, L2-L3 and L4-L5. Anterior osteophytes L1-2-3-4-L5. No acute pathology identified. Lumbar spine CT myelogram dated 5/12/17 reviewed. Per report, multilevel degenerative disc and degenerative facet disease with postsurgical changes at L3-4. Overall, central canal stenosis appears to be mild but the severity of the stenosis does appear to slightly increase on the upright myelographic views. Multilevel foraminal narrowing is present and is severe on the left at L4-5. progressive clumping and peripheral displacement of the cauda equina nerve roots extending below the L3 level, consistent with the sequela of arachnoiditis. At her last clinical appointment, patient was neurologically intact. Patient wished to continue her current treatment. I refilled her Cymbalta 30 mg every day. I encouraged patient to perform her HEP daily. Pt was last seen on 1/30/18 but is seen today earlier than scheduled for a six month f/u secondary to an increase in low back pain with recurrence of radicular symptoms into the LLE extending in an L5 distribution to the ankle. She rates pain 10/10, elevated since her last visit (0-5/10). Her pain is not positional. She is compliant with Cymbalta 30 mg per day. Pt performs her HEP daily.  reviewed.  Body mass index is 27.28 kg/(m^2). PCP: Chloe Gardner MD      Past Medical History:   Diagnosis Date    Arrhythmia     Cardiac disease     Hypercholesteremia     Hyperlipidemia     Hypertension     Pacemaker         Social History     Social History    Marital status:      Spouse name: N/A    Number of children: N/A    Years of education: N/A     Occupational History    Not on file. Social History Main Topics    Smoking status: Former Smoker     Packs/day: 0.50     Years: 30.00     Quit date: 3/21/2009    Smokeless tobacco: Never Used    Alcohol use No    Drug use: No    Sexual activity: Not on file     Other Topics Concern    Not on file     Social History Narrative       Current Outpatient Prescriptions   Medication Sig Dispense Refill    DULoxetine (CYMBALTA) 30 mg capsule Take 1 Cap by mouth daily. Indications: NEUROPATHIC PAIN 90 Cap 1    COMPOUNDMAX BASE crea 240 g by Does Not Apply route four (4) times daily. Anti-Inflam Diclofenac Sodium 3%Gabapentin 3% Lidocaine 2% Prilocaine 2% 240 g 3    naproxen (NAPROSYN) 500 mg tablet take 1 tablet by mouth twice a day if needed for pain  0    amiodarone (CORDARONE) 200 mg tablet Take 200 mg by mouth daily. 0    amLODIPine (NORVASC) 10 mg tablet Take 10 mg by mouth daily. 1    losartan (COZAAR) 100 mg tablet Take 100 mg by mouth daily. 0    metoprolol succinate (TOPROL-XL) 25 mg XL tablet Take 25 mg by mouth daily. 0    potassium chloride (K-DUR, KLOR-CON) 20 mEq tablet Take 20 mEq by mouth daily as needed. 1    RANEXA 500 mg SR tablet Take 500 mg by mouth daily.  triamterene-hydrochlorothiazide (DYAZIDE) 37.5-25 mg per capsule Take 1 Cap by mouth every morning. 1    aspirin delayed-release 81 mg tablet Take  by mouth daily.  cholecalciferol (VITAMIN D3) 1,000 unit tablet Take  by mouth daily.  nitroglycerin (NITROSTAT) 0.4 mg SL tablet by SubLINGual route every five (5) minutes as needed for Chest Pain.       furosemide (LASIX) 20 mg tablet Take 20 mg by mouth daily as needed. 0    pravastatin (PRAVACHOL) 40 mg tablet Take 40 mg by mouth daily. 1    fluticasone (FLONASE) 50 mcg/actuation nasal spray 2 Sprays by Both Nostrils route daily. Allergies   Allergen Reactions    Plavix [Clopidogrel] Rash          PHYSICAL EXAMINATION    Visit Vitals    /64    Pulse 84    Ht 5' 3\" (1.6 m)    Wt 69.9 kg (154 lb)    BMI 27.28 kg/m2       CONSTITUTIONAL: NAD, A&O x 3  SENSATION: Intact to light touch throughout  RANGE OF MOTION: The patient has full passive range of motion in all four extremities. MOTOR:  Straight Leg Raise: Negative, bilateral               Hip Flex Knee Ext Knee Flex Ankle DF GTE Ankle PF Tone   Right +4/5 +4/5 +4/5 +4/5 +4/5 +4/5 +4/5   Left +4/5 +4/5 +4/5 +4/5 +4/5 +4/5 +4/5       ASSESSMENT   Diagnoses and all orders for this visit:    1. Lumbar post-laminectomy syndrome  -     SCHEDULE SURGERY    2. Spinal stenosis of lumbar region with neurogenic claudication  -     SCHEDULE SURGERY    3. Radiculopathy, lumbar region  -     SCHEDULE SURGERY    4. Other intervertebral disc degeneration, lumbar region  -     SCHEDULE SURGERY          IMPRESSION AND PLAN:  Patient reports an increase in low back pain with recurrence of left lower extremity symptoms extending in an L5 distribution. She elects to proceed with lumbar blocks. Upon approval from Dr. Thomas Ring for patient to come off her Aspirin temporarily, I will order an L4/5 epidural. Continue on Cymbalta 30 mg. I encourage patient to perform her HEP daily. I will see the patient back following block. Written by Lavon Mata, as dictated by Christine Gómez MD  I examined the patient, reviewed and agree with the note.

## 2018-05-16 ENCOUNTER — DOCUMENTATION ONLY (OUTPATIENT)
Dept: ORTHOPEDIC SURGERY | Age: 78
End: 2018-05-16

## 2018-06-19 ENCOUNTER — OFFICE VISIT (OUTPATIENT)
Dept: ORTHOPEDIC SURGERY | Age: 78
End: 2018-06-19

## 2018-06-19 VITALS
SYSTOLIC BLOOD PRESSURE: 120 MMHG | HEART RATE: 72 BPM | DIASTOLIC BLOOD PRESSURE: 56 MMHG | HEIGHT: 63 IN | BODY MASS INDEX: 27.11 KG/M2 | WEIGHT: 153 LBS

## 2018-06-19 DIAGNOSIS — M54.16 LUMBAR NEURITIS: ICD-10-CM

## 2018-06-19 DIAGNOSIS — M48.062 SPINAL STENOSIS OF LUMBAR REGION WITH NEUROGENIC CLAUDICATION: Primary | ICD-10-CM

## 2018-06-19 DIAGNOSIS — M96.1 LUMBAR POST-LAMINECTOMY SYNDROME: ICD-10-CM

## 2018-06-19 DIAGNOSIS — M51.36 DDD (DEGENERATIVE DISC DISEASE), LUMBAR: ICD-10-CM

## 2018-06-19 RX ORDER — DOXYCYCLINE 100 MG/1
CAPSULE ORAL
Refills: 0 | COMMUNITY
Start: 2018-06-10 | End: 2018-06-19 | Stop reason: ALTCHOICE

## 2018-06-19 RX ORDER — DULOXETIN HYDROCHLORIDE 60 MG/1
60 CAPSULE, DELAYED RELEASE ORAL DAILY
Qty: 30 CAP | Refills: 1 | Status: SHIPPED | OUTPATIENT
Start: 2018-06-19 | End: 2018-07-17 | Stop reason: SDUPTHER

## 2018-06-19 RX ORDER — MONTELUKAST SODIUM 10 MG/1
TABLET ORAL
Refills: 0 | COMMUNITY
Start: 2018-06-10 | End: 2021-08-19

## 2018-06-19 NOTE — MR AVS SNAPSHOT
[address] [facility] 
 
 
 [address] [facility] 
[phone] Patient: Semaj Sheridan MRN: [id number] YEZ:[id number] Visit Information Date & Time Provider Department Dept. Phone Encounter #  
 6/19/2018  9:15 AM Anjel Akisn, [address] [facility] [place] [phone] Follow-up Instructions Return in about 4 weeks (around 7/17/2018). Upcoming Health Maintenance Date Due DTaP/Tdap/Td series (1 - Tdap) 12/26/1961 ZOSTER VACCINE AGE 60> 10/26/2000 GLAUCOMA SCREENING Q2Y 12/26/2005 Bone Densitometry (Dexa) Screening 12/26/2005 Pneumococcal 65+ Low/Medium Risk (1 of 2 - PCV13) 12/26/2005 MEDICARE YEARLY EXAM 3/14/2018 Influenza Age 5 to Adult 8/1/2018 Allergies as of 6/19/2018  Review Complete On: 6/19/2018 By: Anjel Akins MD  
  
 Severity Noted Reaction Type Reactions Plavix [Clopidogrel]  02/01/2016    Rash Current Immunizations  Never Reviewed No immunizations on file. Not reviewed this visit You Were Diagnosed With   
  
 Codes Comments Spinal stenosis of lumbar region with neurogenic claudication    -  Primary ICD-10-CM: M48.062 
ICD-9-CM: 724.03   
 DDD (degenerative disc disease), lumbar     ICD-10-CM: M51.36 
ICD-9-CM: 722.52 Lumbar neuritis     ICD-10-CM: M54.16 
ICD-9-CM: 724.4 Lumbar post-laminectomy syndrome     ICD-10-CM: M96.1 ICD-9-CM: 722.83 Vitals BP Pulse Height(growth percentile) Weight(growth percentile) BMI Smoking Status 120/56 72 5' 3\" (1.6 m) 153 lb (69.4 kg) 27.1 kg/m2 Former Smoker Vitals History BMI and BSA Data Body Mass Index Body Surface Area  
 27.1 kg/m 2 1.76 m 2 Preferred Pharmacy Pharmacy Name Phone [facility] [address] [place] [address] [phone] Your Updated Medication List  
  
   
 This list is accurate as of 6/19/18  9:32 AM.  Always use your most recent med list.  
  
  
  
  
 amiodarone 200 mg tablet Commonly known as:  CORDARONE Take 200 mg by mouth daily. amLODIPine 10 mg tablet Commonly known as:  Amrita Grebe Take 10 mg by mouth daily. aspirin delayed-release 81 mg tablet Take  by mouth daily. cholecalciferol 1,000 unit tablet Commonly known as:  VITAMIN D3 Take  by mouth daily. COMPOUNDMAX BASE Crea Generic drug:  cream base no. 171 (bulk) 240 g by Does Not Apply route four (4) times daily. Anti-Inflam Diclofenac Sodium 3%Gabapentin 3% Lidocaine 2% Prilocaine 2% * DULoxetine 30 mg capsule Commonly known as:  CYMBALTA Take 1 Cap by mouth daily. Indications: NEUROPATHIC PAIN  
  
 * DULoxetine 60 mg capsule Commonly known as:  CYMBALTA Take 1 Cap by mouth daily. fluticasone 50 mcg/actuation nasal spray Commonly known as:  Arlys Pock 2 Sprays by Both Nostrils route daily. furosemide 20 mg tablet Commonly known as:  LASIX Take 20 mg by mouth daily as needed. losartan 100 mg tablet Commonly known as:  COZAAR Take 100 mg by mouth daily. metoprolol succinate 25 mg XL tablet Commonly known as:  TOPROL-XL Take 25 mg by mouth daily. montelukast 10 mg tablet Commonly known as:  SINGULAIR  
take 1 tablet by mouth at bedtime  
  
 naproxen 500 mg tablet Commonly known as:  NAPROSYN  
take 1 tablet by mouth twice a day if needed for pain  
  
 nitroglycerin 0.4 mg SL tablet Commonly known as:  NITROSTAT  
by SubLINGual route every five (5) minutes as needed for Chest Pain.  
  
 potassium chloride 20 mEq tablet Commonly known as:  K-DUR, KLOR-CON Take 20 mEq by mouth daily as needed. pravastatin 40 mg tablet Commonly known as:  PRAVACHOL Take 40 mg by mouth daily. RANEXA 500 mg SR tablet Generic drug:  ranolazine ER Take 500 mg by mouth daily. triamterene-hydroCHLOROthiazide 37.5-25 mg per capsule Commonly known as:  Killeen Ranks Take 1 Cap by mouth every morning. * Notice: This list has 2 medication(s) that are the same as other medications prescribed for you. Read the directions carefully, and ask your doctor or other care provider to review them with you. Prescriptions Sent to Pharmacy Refills DULoxetine (CYMBALTA) 60 mg capsule 1 Sig: Take 1 Cap by mouth daily. Class: Normal  
 Pharmacy: [address] [place] [address], [address] [place] [address] #: [phone] Route: Oral  
  
Follow-up Instructions Return in about 4 weeks (around 7/17/2018). Introducing [facility]! Elmo Benítez introduces [employer] patient portal. Now you can access parts of your medical record, email your doctor's office, and request medication refills online. 1. In your internet browser, go to [email or web address] 2. Click on the First Time User? Click Here link in the Sign In box. You will see the New Member Sign Up page. 3. Enter your [employer] Access Code exactly as it appears below. You will not need to use this code after youve completed the sign-up process. If you do not sign up before the expiration date, you must request a new code. · [employer] Access Code: [email or web address] Expires: 8/13/2018  9:08 AM 
 
4. Enter the last four digits of your Social Security Number (xxxx) and Date of Birth (mm/dd/yyyy) as indicated and click Submit. You will be taken to the next sign-up page. 5. Create a [employer] ID. This will be your [employer] login ID and cannot be changed, so think of one that is secure and easy to remember. 6. Create a [employer] password. You can change your password at any time. 7. Enter your Password Reset Question and Answer. This can be used at a later time if you forget your password. 8. Enter your e-mail address.  You will receive e-mail notification when new information is available in [employer]. 9. Click Sign Up. You can now view and download portions of your medical record. 10. Click the Download Summary menu link to download a portable copy of your medical information. If you have questions, please visit the Frequently Asked Questions section of the [employer] website. Remember, [employer] is NOT to be used for urgent needs. For medical emergencies, dial 911. Now available from your iPhone and Android! Please provide this summary of care documentation to your next provider. Your primary care clinician is listed as Rafa Loomis. If you have any questions after today's visit, please call [phone].

## 2018-06-19 NOTE — PROGRESS NOTES
[facility]  [address], [place]   Phone: [phone]  Fax: [phone]        PROGRESS NOTE      HISTORY OF PRESENT ILLNESS:  The patient is a 68 y.o. female and was seen today for follow up of low back pain with recurrence of radicular symptoms into the LLE extending in an L5 distribution to the ankle. She denies left groin pain at this time. Pt denies radicular type symptoms of the LLE at this time which previously extended posteriorly to the calf. She notes her pain is the worst in the AM. Her neck and RUE continue to do well. This is a patient with a diagnosis of lumbar postlaminectomy syndrome with h/o lumbar spinal surgery from ~ 2012 performed by Dr. Tatyana Rodriguez. Prior to her surgery, she did not have much in the way of low back pain but just LLE pain. Pt underwent lumbar blocks roughly four years ago but none recently. She was initially seen for c/o intermittent right shoulder pain extending into the RUE n a C6 distribution to the 1st digit right hand, which she reports is doing well. Pt denies any recent falls. Notes from Dr. Jesu Benítez were reviewed. Pt completed physical therapy for her cervical spine with relief and continues with her HEP. She reports previous shoulder injection without relief. Pt completed physical therapy with slight relief. Pt is completing her HEP regularly. She was treated with Percocet with minimal relief. Pt is no longer taking Aleve. Pt completed Medrol Dosepak without benefit. Pt reports nervousness with increased dose of NEURONTIN 600 mg TID. Pt underwent L4/5 epidural on 5/23/17 with good relief. Pt underwent L4/5 lumbar epidural on 10/6/17 with mild relief. Pt denies fever, weight loss, or skin changes. Pt denies history of glaucoma, DM, or seizures. Of note, patient has a pacemaker and is not a candidate for an MRI. Cervical spine XR from 1/16/16 was reviewed.  Per report there was no acute fracture or dislocation cervical spine. There was degenerative discogenic changes C4-C7, most prominent at C5-C6, slightly progressed from comparison study. Cervical spine CT from 2/10/16 was reviewed. Per report, there was multilevel central stenosis due to disc osteophyte complex involving C4-C5 through C6-C7 levels. Stenosis is greatest at C4-C5, mild to moderate in degree. Additional mild-to-moderate C4-C5 and mild C5-C6 cord deformity is present. No high-grade central stenosis is seen. Mild-to-moderate multilevel foraminal stenosis. A RUE EMG from 2/17/16 was reviewed. Per report, it was suggestive of a chronic C7 radiculopathy on the right sided. Preliminary reading of lumbar plain films revealed posterior fusion L3-L4. Hardware intact. Degenerative disc disease at L1-L2, L2-L3 and L4-L5. Anterior osteophytes L1-2-3-4-L5. No acute pathology identified. Lumbar spine CT myelogram dated 5/12/17 reviewed. Per report, multilevel degenerative disc and degenerative facet disease with postsurgical changes at L3-4. Overall, central canal stenosis appears to be mild but the severity of the stenosis does appear to slightly increase on the upright myelographic views. Multilevel foraminal narrowing is present and is severe on the left at L4-5. progressive clumping and peripheral displacement of the cauda equina nerve roots extending below the L3 level, consistent with the sequela of arachnoiditis. At her last clinical appointment, patient reported an increase in low back pain with recurrence of left lower extremity symptoms extending in an L5 distribution. She elected to proceed with lumbar blocks. Upon approval from Dr. Mimi Shields for patient to come off her Aspirin temporarily, I ordered an L4/5 epidural. Continue on Cymbalta 30 mg. I encouraged patient to perform her HEP daily. The patient returns today with pain location and distribution remain unchanged. She rates her pain 5/10, a decrease from her last visit (10/10).  Pt reports some relief following blocks. She is compliant with Cymbalta 30 mg per day. Pt denies change in bowel or bladder habits. Her pain is not positional. Pt performs her HEP daily.  reviewed. Body mass index is 27.1 kg/(m^2). PCP: Armin Aaron MD      Past Medical History:   Diagnosis Date    Arrhythmia     Cardiac disease     Hypercholesteremia     Hyperlipidemia     Hypertension     Pacemaker         Social History     Social History    Marital status:      Spouse name: N/A    Number of children: N/A    Years of education: N/A     Occupational History    Not on file. Social History Main Topics    Smoking status: Former Smoker     Packs/day: 0.50     Years: 30.00     Quit date: 3/21/2009    Smokeless tobacco: Never Used    Alcohol use No    Drug use: No    Sexual activity: Not on file     Other Topics Concern    Not on file     Social History Narrative       Current Outpatient Prescriptions   Medication Sig Dispense Refill    montelukast (SINGULAIR) 10 mg tablet take 1 tablet by mouth at bedtime  0    DULoxetine (CYMBALTA) 60 mg capsule Take 1 Cap by mouth daily. 30 Cap 1    DULoxetine (CYMBALTA) 30 mg capsule Take 1 Cap by mouth daily. Indications: NEUROPATHIC PAIN 90 Cap 1    COMPOUNDMAX BASE crea 240 g by Does Not Apply route four (4) times daily. Anti-Inflam Diclofenac Sodium 3%Gabapentin 3% Lidocaine 2% Prilocaine 2% 240 g 3    naproxen (NAPROSYN) 500 mg tablet take 1 tablet by mouth twice a day if needed for pain  0    amiodarone (CORDARONE) 200 mg tablet Take 200 mg by mouth daily. 0    amLODIPine (NORVASC) 10 mg tablet Take 10 mg by mouth daily. 1    losartan (COZAAR) 100 mg tablet Take 100 mg by mouth daily. 0    metoprolol succinate (TOPROL-XL) 25 mg XL tablet Take 25 mg by mouth daily. 0    potassium chloride (K-DUR, KLOR-CON) 20 mEq tablet Take 20 mEq by mouth daily as needed. 1    RANEXA 500 mg SR tablet Take 500 mg by mouth daily.       triamterene-hydrochlorothiazide (DYAZIDE) 37.5-25 mg per capsule Take 1 Cap by mouth every morning. 1    aspirin delayed-release 81 mg tablet Take  by mouth daily.  cholecalciferol (VITAMIN D3) 1,000 unit tablet Take  by mouth daily.  nitroglycerin (NITROSTAT) 0.4 mg SL tablet by SubLINGual route every five (5) minutes as needed for Chest Pain.  furosemide (LASIX) 20 mg tablet Take 20 mg by mouth daily as needed. 0    pravastatin (PRAVACHOL) 40 mg tablet Take 40 mg by mouth daily. 1    fluticasone (FLONASE) 50 mcg/actuation nasal spray 2 Sprays by Both Nostrils route daily. Allergies   Allergen Reactions    Plavix [Clopidogrel] Rash          PHYSICAL EXAMINATION    Visit Vitals    /56    Pulse 72    Ht 5' 3\" (1.6 m)    Wt 69.4 kg (153 lb)    BMI 27.1 kg/m2       CONSTITUTIONAL: NAD, A&O x 3  SENSATION: Intact to light touch throughout  RANGE OF MOTION: The patient has full passive range of motion in all four extremities. MOTOR:  Straight Leg Raise: Negative, bilateral               Hip Flex Knee Ext Knee Flex Ankle DF GTE Ankle PF Tone   Right +4/5 +4/5 +4/5 +4/5 +4/5 +4/5 +4/5   Left +4/5 +4/5 +4/5 +4/5 +4/5 +4/5 +4/5       ASSESSMENT   Diagnoses and all orders for this visit:    1. Spinal stenosis of lumbar region with neurogenic claudication    2. DDD (degenerative disc disease), lumbar    3. Lumbar neuritis    4. Lumbar post-laminectomy syndrome    Other orders  -     DULoxetine (CYMBALTA) 60 mg capsule; Take 1 Cap by mouth daily. IMPRESSION AND PLAN:  I will increase her Cymbalta to 60 mg daily. Patient advised to call the office if intolerant to higher dose. I will see the patient back in 1 month's time or earlier if needed. Written by India Cross, as dictated by Gaby Snyder MD  I examined the patient, reviewed and agree with the note.

## 2018-06-25 ENCOUNTER — TELEPHONE (OUTPATIENT)
Dept: ORTHOPEDIC SURGERY | Age: 78
End: 2018-06-25

## 2018-06-25 NOTE — TELEPHONE ENCOUNTER
Pt wants know if there's any other or additional medication she can take for her lower back and leg pain.   Please advise pt p# [phone]

## 2018-06-25 NOTE — TELEPHONE ENCOUNTER
Per the note,  Pt denies history of glaucoma, DM, or seizures. Would she like to try a low dose of Topamax, 25 mg QHS?

## 2018-06-26 NOTE — TELEPHONE ENCOUNTER
Continue Cymbalta 60 mg. This was just recently started and may take some time for the higher to dose to be effective.

## 2018-06-28 NOTE — TELEPHONE ENCOUNTER
No, the medication dose increase can take some time to become effective. Does she want to try the topamax 25 mg QHS?

## 2018-06-28 NOTE — TELEPHONE ENCOUNTER
Called Senait Rubén Hartley and she states that she has been taking her Cymbalta 60mg but is still having pain? Is she able to get a MDP?

## 2018-07-02 RX ORDER — TOPIRAMATE 25 MG/1
TABLET ORAL
Qty: 30 TAB | Refills: 0 | Status: SHIPPED | OUTPATIENT
Start: 2018-07-02 | End: 2018-07-17 | Stop reason: SDUPTHER

## 2018-07-02 NOTE — TELEPHONE ENCOUNTER
Spoke to Mrs. Dover and she does not have a history of glaucoma and she is willing to try topamax 25mg QHS. Medication has been called in to the pharmacy.

## 2018-07-17 ENCOUNTER — OFFICE VISIT (OUTPATIENT)
Dept: ORTHOPEDIC SURGERY | Age: 78
End: 2018-07-17

## 2018-07-17 VITALS
HEART RATE: 93 BPM | TEMPERATURE: 96.4 F | WEIGHT: 152.2 LBS | OXYGEN SATURATION: 95 % | SYSTOLIC BLOOD PRESSURE: 109 MMHG | DIASTOLIC BLOOD PRESSURE: 60 MMHG | BODY MASS INDEX: 26.97 KG/M2 | HEIGHT: 63 IN

## 2018-07-17 DIAGNOSIS — M54.16 RADICULOPATHY, LUMBAR REGION: ICD-10-CM

## 2018-07-17 DIAGNOSIS — M51.36 DDD (DEGENERATIVE DISC DISEASE), LUMBAR: Primary | ICD-10-CM

## 2018-07-17 DIAGNOSIS — M96.1 LUMBAR POST-LAMINECTOMY SYNDROME: ICD-10-CM

## 2018-07-17 DIAGNOSIS — M48.062 SPINAL STENOSIS OF LUMBAR REGION WITH NEUROGENIC CLAUDICATION: ICD-10-CM

## 2018-07-17 RX ORDER — TOPIRAMATE 25 MG/1
TABLET ORAL
Qty: 90 TAB | Refills: 0 | Status: SHIPPED | OUTPATIENT
Start: 2018-07-17 | End: 2019-07-15 | Stop reason: SDUPTHER

## 2018-07-17 RX ORDER — DULOXETIN HYDROCHLORIDE 60 MG/1
60 CAPSULE, DELAYED RELEASE ORAL DAILY
Qty: 90 CAP | Refills: 0 | Status: SHIPPED | OUTPATIENT
Start: 2018-07-17 | End: 2019-07-15 | Stop reason: SDUPTHER

## 2018-07-17 NOTE — PROGRESS NOTES
[facility]  [address], [place]   Phone: [phone]  Fax: [phone]        PROGRESS NOTE      HISTORY OF PRESENT ILLNESS:  The patient is a 68 y.o. female and was seen today for follow up of low back pain with recurrence of radicular symptoms into the LLE extending in an L5 distribution to the ankle. She denies left groin pain at this time. Pt denies radicular type symptoms of the LLE at this time which previously extended posteriorly to the calf. She notes her pain is the worst in the AM. Her neck and RUE continue to do well. This is a patient with a diagnosis of lumbar postlaminectomy syndrome with h/o lumbar spinal surgery from ~ 2012 performed by Dr. Konrad Chacon. Prior to her surgery, she did not have much in the way of low back pain but just LLE pain. Pt underwent lumbar blocks roughly four years ago but none recently. She was initially seen for c/o intermittent right shoulder pain extending into the RUE n a C6 distribution to the 1st digit right hand, which she reports is doing well. Pt denies any recent falls. Notes from Dr. Leslie Ludwig were reviewed. Pt completed physical therapy for her cervical spine with relief and continues with her HEP. She reports previous shoulder injection without relief. Pt completed physical therapy with slight relief. Pt is completing her HEP regularly. She was treated with Percocet with minimal relief. Pt is no longer taking Aleve. Pt completed Medrol Dosepak without benefit. Pt reports nervousness with increased dose of NEURONTIN 600 mg TID. Pt underwent L4/5 epidural on 5/23/17 with good relief. Pt underwent L4/5 lumbar epidural on 10/6/17 with mild relief. Pt reports some relief following blocks. Pt denies fever, weight loss, or skin changes. Pt denies history of glaucoma, DM, or seizures. Of note, patient has a pacemaker and is not a candidate for an MRI. Cervical spine XR from 1/16/16 was reviewed.  Per report there was no acute fracture or dislocation cervical spine. There was degenerative discogenic changes C4-C7, most prominent at C5-C6, slightly progressed from comparison study. Cervical spine CT from 2/10/16 was reviewed. Per report, there was multilevel central stenosis due to disc osteophyte complex involving C4-C5 through C6-C7 levels. Stenosis is greatest at C4-C5, mild to moderate in degree. Additional mild-to-moderate C4-C5 and mild C5-C6 cord deformity is present. No high-grade central stenosis is seen. Mild-to-moderate multilevel foraminal stenosis. A RUE EMG from 2/17/16 was reviewed. Per report, it was suggestive of a chronic C7 radiculopathy on the right sided. Preliminary reading of lumbar plain films revealed posterior fusion L3-L4. Hardware intact. Degenerative disc disease at L1-L2, L2-L3 and L4-L5. Anterior osteophytes L1-2-3-4-L5. No acute pathology identified. Lumbar spine CT myelogram dated 5/12/17 reviewed. Per report, multilevel degenerative disc and degenerative facet disease with postsurgical changes at L3-4. Overall, central canal stenosis appears to be mild but the severity of the stenosis does appear to slightly increase on the upright myelographic views. Multilevel foraminal narrowing is present and is severe on the left at L4-5. progressive clumping and peripheral displacement of the cauda equina nerve roots extending below the L3 level, consistent with the sequela of arachnoiditis. At her last clinical appointment, I increased her Cymbalta to 60 mg daily. The patient returns today with pain location and distribution remain unchanged. She states that her LLE pain is the worst at this time. She rates her pain 4/10, a slight decrease from her last visit (5/10). Pt called the office and wished to add another medication after taking increase Cymbalta 60 mg daily for 1 week. She is tolerating Topamax 25 mg qhs with some relief. Pt is completing her HEP. Pt denies change in bowel or bladder habits.  reviewed. Body mass index is 26.96 kg/(m^2). PCP: Jud Gamez MD      Past Medical History:   Diagnosis Date    Arrhythmia     Cardiac disease     Hypercholesteremia     Hyperlipidemia     Hypertension     Pacemaker         Social History     Social History    Marital status:      Spouse name: N/A    Number of children: N/A    Years of education: N/A     Occupational History    Not on file. Social History Main Topics    Smoking status: Former Smoker     Packs/day: 0.50     Years: 30.00     Quit date: 3/21/2009    Smokeless tobacco: Never Used    Alcohol use No    Drug use: No    Sexual activity: Not on file     Other Topics Concern    Not on file     Social History Narrative       Current Outpatient Prescriptions   Medication Sig Dispense Refill    topiramate (TOPAMAX) 25 mg tablet 1 tab PO QHS 90 Tab 0    DULoxetine (CYMBALTA) 60 mg capsule Take 1 Cap by mouth daily. 90 Cap 0    montelukast (SINGULAIR) 10 mg tablet take 1 tablet by mouth at bedtime  0    DULoxetine (CYMBALTA) 30 mg capsule Take 1 Cap by mouth daily. Indications: NEUROPATHIC PAIN 90 Cap 1    COMPOUNDMAX BASE crea 240 g by Does Not Apply route four (4) times daily. Anti-Inflam Diclofenac Sodium 3%Gabapentin 3% Lidocaine 2% Prilocaine 2% 240 g 3    naproxen (NAPROSYN) 500 mg tablet take 1 tablet by mouth twice a day if needed for pain  0    furosemide (LASIX) 20 mg tablet Take 20 mg by mouth daily as needed. 0    amiodarone (CORDARONE) 200 mg tablet Take 200 mg by mouth daily. 0    amLODIPine (NORVASC) 10 mg tablet Take 10 mg by mouth daily. 1    losartan (COZAAR) 100 mg tablet Take 100 mg by mouth daily. 0    metoprolol succinate (TOPROL-XL) 25 mg XL tablet Take 25 mg by mouth daily. 0    potassium chloride (K-DUR, KLOR-CON) 20 mEq tablet Take 20 mEq by mouth daily as needed. 1    pravastatin (PRAVACHOL) 40 mg tablet Take 40 mg by mouth daily.   1    RANEXA 500 mg SR tablet Take 500 mg by mouth daily.  triamterene-hydrochlorothiazide (DYAZIDE) 37.5-25 mg per capsule Take 1 Cap by mouth every morning. 1    aspirin delayed-release 81 mg tablet Take  by mouth daily.  cholecalciferol (VITAMIN D3) 1,000 unit tablet Take  by mouth daily.  fluticasone (FLONASE) 50 mcg/actuation nasal spray 2 Sprays by Both Nostrils route daily.  nitroglycerin (NITROSTAT) 0.4 mg SL tablet by SubLINGual route every five (5) minutes as needed for Chest Pain. Allergies   Allergen Reactions    Plavix [Clopidogrel] Rash          PHYSICAL EXAMINATION    Visit Vitals    /60    Pulse 93    Temp 96.4 °F (35.8 °C) (Oral)    Ht 5' 3\" (1.6 m)    Wt 69 kg (152 lb 3.2 oz)    SpO2 95%    BMI 26.96 kg/m2       CONSTITUTIONAL: NAD, A&O x 3  SENSATION: Intact to light touch throughout  RANGE OF MOTION: The patient has full passive range of motion in all four extremities. MOTOR:  Straight Leg Raise: Negative, bilateral               Hip Flex Knee Ext Knee Flex Ankle DF GTE Ankle PF Tone   Right +4/5 +4/5 +4/5 +4/5 +4/5 +4/5 +4/5   Left +4/5 +4/5 +4/5 +4/5 +4/5 +4/5 +4/5       ASSESSMENT   Diagnoses and all orders for this visit:    1. DDD (degenerative disc disease), lumbar  -     topiramate (TOPAMAX) 25 mg tablet; 1 tab PO QHS  -     DULoxetine (CYMBALTA) 60 mg capsule; Take 1 Cap by mouth daily. 2. Spinal stenosis of lumbar region with neurogenic claudication  -     topiramate (TOPAMAX) 25 mg tablet; 1 tab PO QHS  -     DULoxetine (CYMBALTA) 60 mg capsule; Take 1 Cap by mouth daily. 3. Radiculopathy, lumbar region  -     topiramate (TOPAMAX) 25 mg tablet; 1 tab PO QHS  -     DULoxetine (CYMBALTA) 60 mg capsule; Take 1 Cap by mouth daily. 4. Lumbar post-laminectomy syndrome  -     topiramate (TOPAMAX) 25 mg tablet; 1 tab PO QHS  -     DULoxetine (CYMBALTA) 60 mg capsule; Take 1 Cap by mouth daily. IMPRESSION AND PLAN:  Patient wished to continue her current treatment.  I provided her with refills of her Cymbalta 60 mg daily and Topamax 25 mg qhs. I offered to increase her Topamax but she declined. Patient is neurologically intact. I recommend she continue her HEP daily. Patient is not interested in surgical intervention or lumbar blocks at this time. I will see the patient back in 3 month's time or earlier if needed. Written by Celestine Matute, as dictated by Smita Ly MD  I examined the patient, reviewed and agree with the note.

## 2018-07-17 NOTE — MR AVS SNAPSHOT
Julio Cesar Winchester 
 
 
 [address] [facility] 
[phone] Patient: Raj Harris MRN: [id number] MHC:[id number] Visit Information Date & Time Provider Department Dept. Phone Encounter #  
 7/17/2018 10:15 AM Erin Arriola MD 4 [facility], [address] [facility] - [facility] [phone] [id number] Follow-up Instructions Return in about 3 months (around 10/17/2018). Upcoming Health Maintenance Date Due DTaP/Tdap/Td series (1 - Tdap) 12/26/1961 ZOSTER VACCINE AGE 60> 10/26/2000 GLAUCOMA SCREENING Q2Y 12/26/2005 Bone Densitometry (Dexa) Screening 12/26/2005 Pneumococcal 65+ Low/Medium Risk (1 of 2 - PCV13) 12/26/2005 MEDICARE YEARLY EXAM 3/14/2018 Influenza Age 5 to Adult 8/1/2018 Allergies as of 7/17/2018  Review Complete On: 7/17/2018 By: Erin Arriola MD  
  
 Severity Noted Reaction Type Reactions Plavix [Clopidogrel]  02/01/2016    Rash Current Immunizations  Never Reviewed No immunizations on file. Not reviewed this visit You Were Diagnosed With   
  
 Codes Comments DDD (degenerative disc disease), lumbar    -  Primary ICD-10-CM: M51.36 
ICD-9-CM: 722.52 Spinal stenosis of lumbar region with neurogenic claudication     ICD-10-CM: M48.062 
ICD-9-CM: 724.03 Radiculopathy, lumbar region     ICD-10-CM: M54.16 
ICD-9-CM: 724.4 Lumbar post-laminectomy syndrome     ICD-10-CM: M96.1 ICD-9-CM: 722.83 Vitals BP Pulse Temp Height(growth percentile) Weight(growth percentile) SpO2  
 109/60 93 96.4 °F (35.8 °C) (Oral) 5' 3\" (1.6 m) 152 lb 3.2 oz (69 kg) 95% BMI Smoking Status 26.96 kg/m2 Former Smoker BMI and BSA Data Body Mass Index Body Surface Area  
 26.96 kg/m 2 1.75 m 2 Preferred Pharmacy Pharmacy Name Phone [facility] [address] [phone] Your Updated Medication List  
  
   
This list is accurate as of 7/17/18 11:29 AM.  Always use your most recent med list.  
  
  
  
  
 amiodarone 200 mg tablet Commonly known as:  CORDARONE Take 200 mg by mouth daily. amLODIPine 10 mg tablet Commonly known as:  Karlee Fraction Take 10 mg by mouth daily. aspirin delayed-release 81 mg tablet Take  by mouth daily. cholecalciferol 1,000 unit tablet Commonly known as:  VITAMIN D3 Take  by mouth daily. COMPOUNDMAX BASE Crea Generic drug:  cream base no. 171 (bulk) 240 g by Does Not Apply route four (4) times daily. Anti-Inflam Diclofenac Sodium 3%Gabapentin 3% Lidocaine 2% Prilocaine 2% * DULoxetine 30 mg capsule Commonly known as:  CYMBALTA Take 1 Cap by mouth daily. Indications: NEUROPATHIC PAIN  
  
 * DULoxetine 60 mg capsule Commonly known as:  CYMBALTA Take 1 Cap by mouth daily. fluticasone 50 mcg/actuation nasal spray Commonly known as:  Justin Sacks 2 Sprays by Both Nostrils route daily. furosemide 20 mg tablet Commonly known as:  LASIX Take 20 mg by mouth daily as needed. losartan 100 mg tablet Commonly known as:  COZAAR Take 100 mg by mouth daily. metoprolol succinate 25 mg XL tablet Commonly known as:  TOPROL-XL Take 25 mg by mouth daily. montelukast 10 mg tablet Commonly known as:  SINGULAIR  
take 1 tablet by mouth at bedtime  
  
 naproxen 500 mg tablet Commonly known as:  NAPROSYN  
take 1 tablet by mouth twice a day if needed for pain  
  
 nitroglycerin 0.4 mg SL tablet Commonly known as:  NITROSTAT  
by SubLINGual route every five (5) minutes as needed for Chest Pain.  
  
 potassium chloride 20 mEq tablet Commonly known as:  K-DUR, KLOR-CON Take 20 mEq by mouth daily as needed. pravastatin 40 mg tablet Commonly known as:  PRAVACHOL Take 40 mg by mouth daily. RANEXA 500 mg SR tablet Generic drug:  ranolazine ER  
 Take 500 mg by mouth daily. topiramate 25 mg tablet Commonly known as:  TOPAMAX  
1 tab PO QHS  
  
 triamterene-hydroCHLOROthiazide 37.5-25 mg per capsule Commonly known as:  Karol Lente Take 1 Cap by mouth every morning. * Notice: This list has 2 medication(s) that are the same as other medications prescribed for you. Read the directions carefully, and ask your doctor or other care provider to review them with you. Prescriptions Sent to Pharmacy Refills  
 topiramate (TOPAMAX) 25 mg tablet 0 Si tab PO QHS Class: Normal  
 Pharmacy: [address] [place] [address], [address] [place] [address] Ph #: [phone] DULoxetine (CYMBALTA) 60 mg capsule 0 Sig: Take 1 Cap by mouth daily. Class: Normal  
 Pharmacy: [address] [place] [address], [address] [place] [address] Ph #: [phone] Route: Oral  
  
Follow-up Instructions Return in about 3 months (around 10/17/2018). Introducing [facility] SERVICES! Cami Maya introduces Public[employer] patient portal. Now you can access parts of your medical record, email your doctor's office, and request medication refills online. 1. In your internet browser, go to [email or web address] 2. Click on the First Time User? Click Here link in the Sign In box. You will see the New Member Sign Up page. 3. Enter your Public[employer]uff Access Code exactly as it appears below. You will not need to use this code after youve completed the sign-up process. If you do not sign up before the expiration date, you must request a new code. · Public[employer] Access Code: [email or web address] Expires: 2018  9:08 AM 
 
4. Enter the last four digits of your Social Security Number (xxxx) and Date of Birth (mm/dd/yyyy) as indicated and click Submit. You will be taken to the next sign-up page. 5. Create a PublicStuff ID.  This will be your PublicStuff login ID and cannot be changed, so think of one that is secure and easy to remember. 6. Create a Glocal password. You can change your password at any time. 7. Enter your Password Reset Question and Answer. This can be used at a later time if you forget your password. 8. Enter your e-mail address. You will receive e-mail notification when new information is available in [address] 9. Click Sign Up. You can now view and download portions of your medical record. 10. Click the Download Summary menu link to download a portable copy of your medical information. If you have questions, please visit the Frequently Asked Questions section of the Glocal website. Remember, Glocal is NOT to be used for urgent needs. For medical emergencies, dial 911. Now available from your iPhone and Android! Please provide this summary of care documentation to your next provider. Your primary care clinician is listed as [facility]. If you have any questions after today's visit, please call [phone].

## 2018-10-17 ENCOUNTER — OFFICE VISIT (OUTPATIENT)
Dept: ORTHOPEDIC SURGERY | Age: 78
End: 2018-10-17

## 2018-10-17 VITALS
DIASTOLIC BLOOD PRESSURE: 64 MMHG | HEIGHT: 63 IN | HEART RATE: 73 BPM | OXYGEN SATURATION: 96 % | BODY MASS INDEX: 27.11 KG/M2 | SYSTOLIC BLOOD PRESSURE: 131 MMHG | WEIGHT: 153 LBS

## 2018-10-17 DIAGNOSIS — M54.16 LUMBAR RADICULOPATHY: ICD-10-CM

## 2018-10-17 DIAGNOSIS — M48.062 LUMBAR STENOSIS WITH NEUROGENIC CLAUDICATION: ICD-10-CM

## 2018-10-17 DIAGNOSIS — M51.36 DDD (DEGENERATIVE DISC DISEASE), LUMBAR: Primary | ICD-10-CM

## 2018-10-17 DIAGNOSIS — M96.1 LUMBAR POST-LAMINECTOMY SYNDROME: ICD-10-CM

## 2018-10-17 RX ORDER — TOPIRAMATE 25 MG/1
TABLET ORAL
Qty: 30 TAB | Refills: 1 | Status: SHIPPED | OUTPATIENT
Start: 2018-10-17 | End: 2018-12-12 | Stop reason: SDUPTHER

## 2018-10-17 RX ORDER — DULOXETIN HYDROCHLORIDE 60 MG/1
60 CAPSULE, DELAYED RELEASE ORAL DAILY
Qty: 90 CAP | Refills: 1 | Status: SHIPPED | OUTPATIENT
Start: 2018-10-17 | End: 2019-05-06 | Stop reason: SDUPTHER

## 2018-10-17 NOTE — PROGRESS NOTES
[facility] 
[address] [facility] [place], [address]  Phone: [phone] Fax: [phone] PROGRESS NOTE HISTORY OF PRESENT ILLNESS: 
The patient is a 68 y.o. female and was seen today for follow up of low back pain with recurrence of radicular symptoms into the LLE extending in an L5 distribution to the ankle. She states that her LLE pain is the worst at this time. She denies left groin pain at this time. Pt denies radicular type symptoms of the LLE at this time which previously extended posteriorly to the calf. She notes her pain is the worst in the AM. Her neck and RUE continue to do well. This is a patient with a diagnosis of lumbar postlaminectomy syndrome with h/o lumbar spinal surgery from ~ 2012 performed by Dr. Helen Andrews. Prior to her surgery, she did not have much in the way of low back pain but just LLE pain. Pt underwent lumbar blocks roughly four years ago but none recently. She was initially seen for c/o intermittent right shoulder pain extending into the RUE n a C6 distribution to the 1st digit right hand, which she reports is doing well. Pt denies any recent falls. Notes from Dr. Felecia Rubalcava were reviewed. Pt completed physical therapy for her cervical spine with relief and continues with her HEP. She reports previous shoulder injection without relief. Pt completed physical therapy with slight relief. Pt is completing her HEP regularly. She was treated with Percocet with minimal relief. Pt is no longer taking Aleve. Pt completed Medrol Dosepak without benefit. Pt reports nervousness with increased dose of NEURONTIN 600 mg TID. Pt underwent L4/5 epidural on 5/23/17 with good relief. Pt underwent L4/5 lumbar epidural on 10/6/17 with mild relief. Pt reports some relief following blocks. Pt denies fever, weight loss, or skin changes. Pt denies history of glaucoma, DM, or seizures.  Of note, patient has a pacemaker and is not a candidate for an MRI. Cervical spine XR from 1/16/16 was reviewed. Per report there was no acute fracture or dislocation cervical spine. There was degenerative discogenic changes C4-C7, most prominent at C5-C6, slightly progressed from comparison study. Cervical spine CT from 2/10/16 was reviewed. Per report, there was multilevel central stenosis due to disc osteophyte complex involving C4-C5 through C6-C7 levels. Stenosis is greatest at C4-C5, mild to moderate in degree. Additional mild-to-moderate C4-C5 and mild C5-C6 cord deformity is present. No high-grade central stenosis is seen. Mild-to-moderate multilevel foraminal stenosis. A RUE EMG from 2/17/16 was reviewed. Per report, it was suggestive of a chronic C7 radiculopathy on the right sided. Preliminary reading of lumbar plain films revealed posterior fusion L3-L4. Hardware intact. Degenerative disc disease at L1-L2, L2-L3 and L4-L5. Anterior osteophytes L1-2-3-4-L5. No acute pathology identified. Lumbar spine CT myelogram dated 5/12/17 reviewed. Per report, multilevel degenerative disc and degenerative facet disease with postsurgical changes at L3-4. Overall, central canal stenosis appears to be mild but the severity of the stenosis does appear to slightly increase on the upright myelographic views. Multilevel foraminal narrowing is present and is severe on the left at L4-5. Progressive clumping and peripheral displacement of the cauda equina nerve roots extending below the L3 level, consistent with the sequela of arachnoiditis. At her last clinical appointment, patient wished to continue her current treatment. I provided her with refills of her Cymbalta 60 mg daily and Topamax 25 mg qhs. I offered to increase her Topamax but she declined. The patient returns today with low back pain without radicular symptoms. She continues to have neck and RUE pain.  She rates her pain 5/10, a slight increase from her last visit (4/10). She is compliant with Topamax 25 mg qhs and Cymbalta 60 mg daily. Pt is completing her HEP daily. Pt denies change in bowel or bladder habits. Pt denies dropping things or loss of balance.  reviewed. Body mass index is 27.1 kg/m². PCP: Hari Lamb MD 
 
 
Past Medical History:  
Diagnosis Date  Arrhythmia  Cardiac disease  Hypercholesteremia  Hyperlipidemia  Hypertension  Pacemaker Social History Socioeconomic History  Marital status:  Spouse name: Not on file  Number of children: Not on file  Years of education: Not on file  Highest education level: Not on file Social Needs  Financial resource strain: Not on file  Food insecurity - worry: Not on file  Food insecurity - inability: Not on file  Transportation needs - medical: Not on file  Transportation needs - non-medical: Not on file Occupational History  Not on file Tobacco Use  Smoking status: Former Smoker Packs/day: 0.50 Years: 30.00 Pack years: 15.00 Last attempt to quit: 3/21/2009 Years since quittin.5  Smokeless tobacco: Never Used Substance and Sexual Activity  Alcohol use: No  
 Drug use: No  
 Sexual activity: Not on file Other Topics Concern  Not on file Social History Narrative  Not on file Current Outpatient Medications Medication Sig Dispense Refill  topiramate (TOPAMAX) 25 mg tablet 1 tab PO QHS 90 Tab 0  
 DULoxetine (CYMBALTA) 60 mg capsule Take 1 Cap by mouth daily. 90 Cap 0  
 montelukast (SINGULAIR) 10 mg tablet take 1 tablet by mouth at bedtime  0  
 DULoxetine (CYMBALTA) 30 mg capsule Take 1 Cap by mouth daily. Indications: NEUROPATHIC PAIN 90 Cap 1  
 COMPOUNDMAX BASE crea 240 g by Does Not Apply route four (4) times daily.  Anti-Inflam Diclofenac Sodium 3%Gabapentin 3% Lidocaine 2% Prilocaine 2% 240 g 3  
  naproxen (NAPROSYN) 500 mg tablet take 1 tablet by mouth twice a day if needed for pain  0  
 furosemide (LASIX) 20 mg tablet Take 20 mg by mouth daily as needed. 0  
 amiodarone (CORDARONE) 200 mg tablet Take 200 mg by mouth daily. 0  
 amLODIPine (NORVASC) 10 mg tablet Take 10 mg by mouth daily. 1  
 losartan (COZAAR) 100 mg tablet Take 100 mg by mouth daily. 0  
 metoprolol succinate (TOPROL-XL) 25 mg XL tablet Take 25 mg by mouth daily. 0  
 potassium chloride (K-DUR, KLOR-CON) 20 mEq tablet Take 20 mEq by mouth daily as needed. 1  
 pravastatin (PRAVACHOL) 40 mg tablet Take 40 mg by mouth daily. 1  
 RANEXA 500 mg SR tablet Take 500 mg by mouth daily.  triamterene-hydrochlorothiazide (DYAZIDE) 37.5-25 mg per capsule Take 1 Cap by mouth every morning. 1  
 aspirin delayed-release 81 mg tablet Take  by mouth daily.  cholecalciferol (VITAMIN D3) 1,000 unit tablet Take  by mouth daily.  fluticasone (FLONASE) 50 mcg/actuation nasal spray 2 Sprays by Both Nostrils route daily.  nitroglycerin (NITROSTAT) 0.4 mg SL tablet by SubLINGual route every five (5) minutes as needed for Chest Pain. Allergies Allergen Reactions  Plavix [Clopidogrel] Rash PHYSICAL EXAMINATION Visit Vitals /64 Pulse 73 Ht 5' 3\" (1.6 m) Wt 153 lb (69.4 kg) LMP  (Exact Date) SpO2 96% BMI 27.10 kg/m² CONSTITUTIONAL: NAD, A&O x 3 SENSATION: Intact to light touch throughout NEURO: Oscar's is negative bilaterally. RANGE OF MOTION: The patient has full passive range of motion in all four extremities. MOTOR: 
Straight Leg Raise: Negative, bilateral 
 
 Shoulder AB/Flex Elbow Flex Wrist Ext Elbow Ext Wrist Flex Hand Intrin Tone Right +4/5 +4/5 +4/5 +4/5 +4/5 +4/5 +4/5 Left +4/5 +4/5 +4/5 +4/5 +4/5 +4/5 +4/5 Hip Flex Knee Ext Knee Flex Ankle DF GTE Ankle PF Tone Right +4/5 +4/5 +4/5 +4/5 +4/5 +4/5 +4/5 Left +4/5 +4/5 +4/5 +4/5 +4/5 +4/5 +4/5 ASSESSMENT Diagnoses and all orders for this visit: 
 
DDD (degenerative disc disease), lumbar Lumbar stenosis with neurogenic claudication Lumbar radiculopathy Lumbar post-laminectomy syndrome IMPRESSION AND PLAN: 
Patient wished to continue her current treatment. I provided her with refills of her Cymbalta 60 mg daily and Topamax 25 mg qhs. I offered to increase her Topamax but she declined. Patient is neurologically intact. I recommend she continue with her HEP daily. Patient is not interested in surgical intervention or lumbar blocks at this time. I will see the patient back in 6 month's time or earlier if needed. Written by Nita Mohs, as dictated by Jamey Moreno MD 
I examined the patient, reviewed and agree with the note. Saw Ghosh is a 68 y.o. female. HPI 
 
ROS Physical Exam

## 2018-12-12 RX ORDER — TOPIRAMATE 25 MG/1
25 TABLET ORAL
Qty: 90 TAB | Refills: 0 | Status: SHIPPED | OUTPATIENT
Start: 2018-12-12 | End: 2019-07-15 | Stop reason: SDUPTHER

## 2018-12-12 NOTE — TELEPHONE ENCOUNTER
Last Visit: 10/17/2018 with MD Melida Aburto  Next Appointment: noted to f/u in 6 months  Previous Refill Encounter(s): 10/17/2018 per MD Melida Aburto #30 with 1 refill    Requested Prescriptions     Pending Prescriptions Disp Refills    topiramate (TOPAMAX) 25 mg tablet 90 Tab 1     Sig: Take 1 Tab by mouth nightly.

## 2018-12-13 ENCOUNTER — TELEPHONE (OUTPATIENT)
Dept: ORTHOPEDIC SURGERY | Age: 78
End: 2018-12-13

## 2018-12-13 NOTE — TELEPHONE ENCOUNTER
Patient would have to be seen prior to any medications being sent out. She was stable at last visit and was to FU in 6 months.

## 2018-12-13 NOTE — TELEPHONE ENCOUNTER
12- called patient , said she wasn't going to make an appointment now now.  She was going to her family

## 2018-12-13 NOTE — TELEPHONE ENCOUNTER
12- PATIENT STOPPED BY THE [facility] OFFICE WINDOW TO REQUEST PAIN MEDICATION. THE PAIN STARTED LAST NIGHT IN HER LEFT LEG. PLEASE CALL SOMETHING IN TO [facility] IN [place].

## 2019-05-06 RX ORDER — DULOXETIN HYDROCHLORIDE 60 MG/1
60 CAPSULE, DELAYED RELEASE ORAL DAILY
Qty: 90 CAP | Refills: 0 | Status: SHIPPED | OUTPATIENT
Start: 2019-05-06 | End: 2019-07-15 | Stop reason: SDUPTHER

## 2019-05-06 NOTE — TELEPHONE ENCOUNTER
Last Visit: 10/17/18 with MD Collette Montana  Next Appointment: return in 6 months  Previous Refill Encounter(s): 10/17/18 #90 with 1 refill    Requested Prescriptions     Pending Prescriptions Disp Refills    DULoxetine (CYMBALTA) 60 mg capsule 90 Cap 1     Sig: Take 1 Cap by mouth daily.

## 2019-07-15 ENCOUNTER — OFFICE VISIT (OUTPATIENT)
Dept: ORTHOPEDIC SURGERY | Age: 79
End: 2019-07-15

## 2019-07-15 VITALS
BODY MASS INDEX: 27.71 KG/M2 | WEIGHT: 156.4 LBS | HEIGHT: 63 IN | DIASTOLIC BLOOD PRESSURE: 64 MMHG | OXYGEN SATURATION: 96 % | TEMPERATURE: 96.6 F | HEART RATE: 69 BPM | SYSTOLIC BLOOD PRESSURE: 136 MMHG

## 2019-07-15 DIAGNOSIS — M48.062 SPINAL STENOSIS OF LUMBAR REGION WITH NEUROGENIC CLAUDICATION: ICD-10-CM

## 2019-07-15 DIAGNOSIS — M96.1 LUMBAR POST-LAMINECTOMY SYNDROME: ICD-10-CM

## 2019-07-15 DIAGNOSIS — M51.36 DDD (DEGENERATIVE DISC DISEASE), LUMBAR: ICD-10-CM

## 2019-07-15 DIAGNOSIS — M54.16 RADICULOPATHY, LUMBAR REGION: ICD-10-CM

## 2019-07-15 RX ORDER — DULOXETIN HYDROCHLORIDE 60 MG/1
60 CAPSULE, DELAYED RELEASE ORAL DAILY
Qty: 90 CAP | Refills: 1 | Status: SHIPPED | OUTPATIENT
Start: 2019-07-15 | End: 2020-02-12 | Stop reason: SDUPTHER

## 2019-07-15 RX ORDER — TOPIRAMATE 25 MG/1
25 TABLET ORAL
Qty: 90 TAB | Refills: 0 | Status: SHIPPED | OUTPATIENT
Start: 2019-07-15 | End: 2019-10-09 | Stop reason: SDUPTHER

## 2019-07-15 NOTE — PATIENT INSTRUCTIONS
Neck Arthritis: Exercises  Your Care Instructions  Here are some examples of typical rehabilitation exercises for your condition. Start each exercise slowly. Ease off the exercise if you start to have pain. Your doctor or physical therapist will tell you when you can start these exercises and which ones will work best for you. How to do the exercises  Neck stretches to the side    1. This stretch works best if you keep your shoulder down as you lean away from it. To help you remember to do this, start by relaxing your shoulders and lightly holding on to your thighs or your chair. 2. Tilt your head toward your shoulder and hold for 15 to 30 seconds. Let the weight of your head stretch your muscles. 3. Repeat 2 to 4 times toward each shoulder. Chin tuck    1. Lie on the floor with a rolled-up towel under your neck. Your head should be touching the floor. 2. Slowly bring your chin toward your chest.  3. Hold for a count of 6, and then relax for up to 10 seconds. 4. Repeat 8 to 12 times. Active cervical rotation    1. Sit in a firm chair, or stand up straight. 2. Keeping your chin level, turn your head to the right, and hold for 15 to 30 seconds. 3. Turn your head to the left and hold for 15 to 30 seconds. 4. Repeat 2 to 4 times to each side. Shoulder blade squeeze    1. While standing, squeeze your shoulder blades together. 2. Do not raise your shoulders up as you are squeezing. 3. Hold for 6 seconds. 4. Repeat 8 to 12 times. Shoulder rolls    1. Sit comfortably with your feet shoulder-width apart. You can also do this exercise standing up. 2. Roll your shoulders up, then back, and then down in a smooth, circular motion. 3. Repeat 2 to 4 times. Follow-up care is a key part of your treatment and safety. Be sure to make and go to all appointments, and call your doctor if you are having problems.  It's also a good idea to know your test results and keep a list of the medicines you take.  Where can you learn more? Go to http://camille-albaro.info/. Enter Z141 in the search box to learn more about \"Neck Arthritis: Exercises. \"  Current as of: September 20, 2018  Content Version: 11.9  © 6623-9352 [employer]. Care instructions adapted under license by [employer] (which disclaims liability or warranty for this information). If you have questions about a medical condition or this instruction, always ask your healthcare professional. [address] any warranty or liability for your use of this information. Back Stretches: Exercises  Your Care Instructions  Here are some examples of exercises for stretching your back. Start each exercise slowly. Ease off the exercise if you start to have pain. Your doctor or physical therapist will tell you when you can start these exercises and which ones will work best for you. How to do the exercises  Overhead stretch    4. Stand comfortably with your feet shoulder-width apart. 5. Looking straight ahead, raise both arms over your head and reach toward the ceiling. Do not allow your head to tilt back. 6. Hold for 15 to 30 seconds, then lower your arms to your sides. 7. Repeat 2 to 4 times. Side stretch    5. Stand comfortably with your feet shoulder-width apart. 6. Raise one arm over your head, and then lean to the other side. 7. Slide your hand down your leg as you let the weight of your arm gently stretch your side muscles. Hold for 15 to 30 seconds. 8. Repeat 2 to 4 times on each side. Press-up    5. Lie on your stomach, supporting your body with your forearms. 6. Press your elbows down into the floor to raise your upper back. As you do this, relax your stomach muscles and allow your back to arch without using your back muscles. As your press up, do not let your hips or pelvis come off the floor. 7. Hold for 15 to 30 seconds, then relax. 8. Repeat 2 to 4 times.     Relax and rest    5. Lie on your back with a rolled towel under your neck and a pillow under your knees. Extend your arms comfortably to your sides. 6. Relax and breathe normally. 7. Remain in this position for about 10 minutes. 8. If you can, do this 2 or 3 times each day. Follow-up care is a key part of your treatment and safety. Be sure to make and go to all appointments, and call your doctor if you are having problems. It's also a good idea to know your test results and keep a list of the medicines you take. Where can you learn more? Go to http://camille-albaro.info/. Enter T772 in the search box to learn more about \"Back Stretches: Exercises. \"  Current as of: September 20, 2018  Content Version: 11.9  © 1017-3636 [employer]. Care instructions adapted under license by [employer] (which disclaims liability or warranty for this information). If you have questions about a medical condition or this instruction, always ask your healthcare professional. [address] any warranty or liability for your use of this information.

## 2019-07-15 NOTE — PROGRESS NOTES
[facility]  [address], [address]  [facility], [address]  Phone: [phone]  Fax: [phone]    Edmar Hutson  : 1940  PCP: Christopher Martinez MD    PROGRESS NOTE    HISTORY OF PRESENT ILLNESS:  No chief complaint on file. Helen Valencia is a 66 y.o.  female with history of low back pain with recurrence of radicular symptoms into the LLE extending in an L5 distribution to the ankle. She states that her LLE pain is the worst at this time. She denies left groin pain at this time. Pt denies radicular type symptoms of the LLE at this time which previously extended posteriorly to the calf. She notes her pain is the worst in the AM. Her neck and RUE continue to do well. This is a patient with a diagnosis of lumbar postlaminectomy syndrome with h/o lumbar spinal surgery from ~  performed by Dr. Prem Lew. Prior to her surgery, she did not have much in the way of low back pain but just LLE pain. Pt underwent lumbar blocks roughly four years ago but none recently. Pt reports nervousness with increased dose of NEURONTIN 600 mg TID. Pt underwent L4/5 epidural on 17 with good relief. Pt underwent L4/5 lumbar epidural on 10/6/17 with mild relief. Pt reports some relief following blocks. Pt denies fever, weight loss, or skin changes. Pt denies history of glaucoma, DM, or seizures. Of note, patient has a pacemaker and is not a candidate for an MRI. At her last visit, she was to continue Cymbalta 60 mg and Topamax 25 mg QHS. Today, the medication regimen is working well. Her pain is controlled. Denies bladder/bowel dysfunction, saddle paresthesia, weakness, gait disturbance, or other neurological deficit. Pt at this time desires to continue with current care/proceed with medication evaluation. Cervical spine XR from 16 was reviewed. Per report there was no acute fracture or dislocation cervical spine.  There was degenerative discogenic changes C4-C7, most prominent at C5-C6, slightly progressed from comparison study. Cervical spine CT from 2/10/16 was reviewed. Per report, there was multilevel central stenosis due to disc osteophyte complex involving C4-C5 through C6-C7 levels. Stenosis is greatest at C4-C5, mild to moderate in degree. Additional mild-to-moderate C4-C5 and mild C5-C6 cord deformity is present. No high-grade central stenosis is seen. Mild-to-moderate multilevel foraminal stenosis. A RUE EMG from 2/17/16 was reviewed. Per report, it was suggestive of a chronic C7 radiculopathy on the right sided. Preliminary reading of lumbar plain films revealed posterior fusion L3-L4. Hardware intact. Degenerative disc disease at L1-L2, L2-L3 and L4-L5. Anterior osteophytes L1-2-3-4-L5. No acute pathology identified. Lumbar spine CT myelogram dated 5/12/17 reviewed. Per report, multilevel degenerative disc and degenerative facet disease with postsurgical changes at L3-4. Overall, central canal stenosis appears to be mild but the severity of the stenosis does appear to slightly increase on the upright myelographic views. Multilevel foraminal narrowing is present and is severe on the left at L4-5. Progressive clumping and peripheral displacement of the cauda equina nerve roots extending below the L3 level, consistent with the sequela of arachnoiditis. ASSESSMENT  66 y.o. female with back and neck pain. Diagnoses and all orders for this visit:    1. DDD (degenerative disc disease), lumbar  -     DULoxetine (CYMBALTA) 60 mg capsule; Take 1 Cap by mouth daily. -     topiramate (TOPAMAX) 25 mg tablet; Take 1 Tab by mouth nightly. 2. Spinal stenosis of lumbar region with neurogenic claudication  -     DULoxetine (CYMBALTA) 60 mg capsule; Take 1 Cap by mouth daily. -     topiramate (TOPAMAX) 25 mg tablet; Take 1 Tab by mouth nightly. 3. Radiculopathy, lumbar region  -     DULoxetine (CYMBALTA) 60 mg capsule; Take 1 Cap by mouth daily.   - topiramate (TOPAMAX) 25 mg tablet; Take 1 Tab by mouth nightly. 4. Lumbar post-laminectomy syndrome  -     DULoxetine (CYMBALTA) 60 mg capsule; Take 1 Cap by mouth daily. -     topiramate (TOPAMAX) 25 mg tablet; Take 1 Tab by mouth nightly. IMPRESSION/PLAN    1) Pt was given information on neck and back exercises. 2) cont Topamax and cymbalta  3) may try OTC tylenol for pain, heat and ice. 4) Ms. Diomedes Patiño has a reminder for a \"due or due soon\" health maintenance. I have asked that she contact her primary care provider, Ivan Thurman MD, for follow-up on this health maintenance. 5) We have informed patient to notify us for immediate appointment if he has any worsening neurogical symptoms or if an emergency situation presents, then call 911  5) Pt will follow-up in 6 months for med fu. Risks and benefits of ongoing therapy have been reviewed with the patient.  is appropriate. PAST MEDICAL HISTORY  Past Medical History:   Diagnosis Date    Arrhythmia     Cardiac disease     Hypercholesteremia     Hyperlipidemia     Hypertension     Pacemaker         MEDICATIONS  Current Outpatient Medications   Medication Sig Dispense Refill    DULoxetine (CYMBALTA) 60 mg capsule Take 1 Cap by mouth daily. 90 Cap 1    topiramate (TOPAMAX) 25 mg tablet Take 1 Tab by mouth nightly. 90 Tab 0    furosemide (LASIX) 20 mg tablet Take 20 mg by mouth daily as needed. 0    amiodarone (CORDARONE) 200 mg tablet Take 200 mg by mouth daily. 0    amLODIPine (NORVASC) 10 mg tablet Take 10 mg by mouth daily. 1    losartan (COZAAR) 100 mg tablet Take 100 mg by mouth daily. 0    aspirin delayed-release 81 mg tablet Take  by mouth daily.  cholecalciferol (VITAMIN D3) 1,000 unit tablet Take  by mouth daily.  fluticasone (FLONASE) 50 mcg/actuation nasal spray 2 Sprays by Both Nostrils route daily.       montelukast (SINGULAIR) 10 mg tablet take 1 tablet by mouth at bedtime  0    COMPOUNDMAX BASE crea 240 g by Does Not Apply route four (4) times daily. Anti-Inflam Diclofenac Sodium 3%Gabapentin 3% Lidocaine 2% Prilocaine 2% 240 g 3    naproxen (NAPROSYN) 500 mg tablet take 1 tablet by mouth twice a day if needed for pain  0    metoprolol succinate (TOPROL-XL) 25 mg XL tablet Take 25 mg by mouth daily. 0    potassium chloride (K-DUR, KLOR-CON) 20 mEq tablet Take 20 mEq by mouth daily as needed. 1    pravastatin (PRAVACHOL) 40 mg tablet Take 40 mg by mouth daily. 1    RANEXA 500 mg SR tablet Take 500 mg by mouth daily.  triamterene-hydrochlorothiazide (DYAZIDE) 37.5-25 mg per capsule Take 1 Cap by mouth every morning. 1    nitroglycerin (NITROSTAT) 0.4 mg SL tablet by SubLINGual route every five (5) minutes as needed for Chest Pain.          ALLERGIES  Allergies   Allergen Reactions    Plavix [Clopidogrel] Rash       SOCIAL HISTORY    Social History     Socioeconomic History    Marital status:      Spouse name: Not on file    Number of children: Not on file    Years of education: Not on file    Highest education level: Not on file   Occupational History    Not on file   Social Needs    Financial resource strain: Not on file    Food insecurity:     Worry: Not on file     Inability: Not on file    Transportation needs:     Medical: Not on file     Non-medical: Not on file   Tobacco Use    Smoking status: Former Smoker     Packs/day: 0.50     Years: 30.00     Pack years: 15.00     Last attempt to quit: 3/21/2009     Years since quitting: 10.3    Smokeless tobacco: Never Used   Substance and Sexual Activity    Alcohol use: No    Drug use: No    Sexual activity: Not on file   Lifestyle    Physical activity:     Days per week: Not on file     Minutes per session: Not on file    Stress: Not on file   Relationships    Social connections:     Talks on phone: Not on file     Gets together: Not on file     Attends Muslim service: Not on file     Active member of club or organization: Not on file     Attends meetings of clubs or organizations: Not on file     Relationship status: Not on file    Intimate partner violence:     Fear of current or ex partner: Not on file     Emotionally abused: Not on file     Physically abused: Not on file     Forced sexual activity: Not on file   Other Topics Concern    Not on file   Social History Narrative    Not on file       SUBJECTIVE      Pain Scale: 5/10    Pain Assessment  7/15/2019   Location of Pain Back   Pain Location Comment -   Location Modifiers -   Severity of Pain 5   Quality of Pain Sharp   Quality of Pain Comment -   Duration of Pain A few hours   Frequency of Pain Intermittent   Aggravating Factors Other (Comment)   Aggravating Factors Comment nothing   Limiting Behavior -   Relieving Factors -   Relieving Factors Comment -   Result of Injury -       Accompanied by self. REVIEW OF SYSTEMS  ROS    Constitutional: Negative for fever, chills, or weight change. Respiratory: Negative for cough or shortness of breath. Cardiovascular: Negative for chest pain or palpitations. Gastrointestinal: Negative for acid reflux, change in bowel habits, or constipation. Genitourinary: Negative for incontinence, dysuria and flank pain. Musculoskeletal: Positive for bck pain. Skin: Negative for rash. Neurological: Negative for headaches, dizziness, or numbness. Endo/Heme/Allergies: Negative . Psychiatric/Behavioral: Negative. PHYSICAL EXAMINATION  Visit Vitals  /64 (BP 1 Location: Left arm, BP Patient Position: Sitting)   Pulse 69   Temp 96.6 °F (35.9 °C) (Oral)   Ht 5' 3\" (1.6 m)   Wt 156 lb 6.4 oz (70.9 kg)   SpO2 96%   BMI 27.71 kg/m²       Constitutional: Well developed,  well nourished,  awake, alert, and in no acute distress. Neurological:  Sensation to light touch is intact. Psychiatric: Affect and mood are appropriate.    Integumentary: No rashes or abrasions noted on exposed areas,  warm, dry and intact. Cardiovascular/Peripheral Vascular:  No peripheral edema is noted. Lymphatic:  No evidence of lymphedema. No cervical lymphadenopathy. SPINE/MUSCULOSKELETAL EXAM    Cervical spine:  Neck is midline. Normal muscle tone. No focal atrophy is noted. Shoulder ROM intact. No Tenderness to palpation . Negative Spurling's sign. Negative Tinel's sign. Negative Wills's sign. Lumbar spine:  No rash, ecchymosis, or gross obliquity. No fasciculations. No focal atrophy is noted. Range of motion is intact. No Tenderness to palpation . SI joints non-tender. Trochanters non tender. Musculoskeletal:  No pain with extension, axial loading, or forward flexion. No pain with internal or external rotation of her hips. MOTOR    Biceps  Triceps Deltoids Wrist Ext Wrist Flex Hand Intrin   Right +4/5 +4/5 +4/5 +4/5 +4/5 +4/5   Left +4/5 +4/5 +4/5 +4/5 +4/5 +4/5      Hip Flex  Quads Hamstrings Ankle DF EHL Ankle PF   Right +4/5 +4/5 +4/5 +4/5 +4/5 +4/5   Left +4/5 +4/5 +4/5 +4/5 +4/5 +4/5     Straight Leg raise - bialteraly. normal gait and station    Ambulation without assistive device. full weight bearing, non-antalgic gait.     Magnolia Cowden, NP

## 2019-10-09 DIAGNOSIS — M51.36 DDD (DEGENERATIVE DISC DISEASE), LUMBAR: ICD-10-CM

## 2019-10-09 DIAGNOSIS — M48.062 SPINAL STENOSIS OF LUMBAR REGION WITH NEUROGENIC CLAUDICATION: ICD-10-CM

## 2019-10-09 DIAGNOSIS — M54.16 RADICULOPATHY, LUMBAR REGION: ICD-10-CM

## 2019-10-09 DIAGNOSIS — M96.1 LUMBAR POST-LAMINECTOMY SYNDROME: ICD-10-CM

## 2019-10-10 RX ORDER — TOPIRAMATE 25 MG/1
TABLET ORAL
Qty: 90 TAB | Refills: 0 | Status: SHIPPED | OUTPATIENT
Start: 2019-10-10 | End: 2020-02-12 | Stop reason: SDUPTHER

## 2020-02-12 ENCOUNTER — OFFICE VISIT (OUTPATIENT)
Dept: ORTHOPEDIC SURGERY | Age: 80
End: 2020-02-12

## 2020-02-12 VITALS
RESPIRATION RATE: 16 BRPM | HEART RATE: 86 BPM | TEMPERATURE: 98.1 F | BODY MASS INDEX: 26.57 KG/M2 | SYSTOLIC BLOOD PRESSURE: 128 MMHG | OXYGEN SATURATION: 99 % | WEIGHT: 150 LBS | DIASTOLIC BLOOD PRESSURE: 72 MMHG

## 2020-02-12 DIAGNOSIS — M51.36 DDD (DEGENERATIVE DISC DISEASE), LUMBAR: ICD-10-CM

## 2020-02-12 DIAGNOSIS — M54.16 RADICULOPATHY, LUMBAR REGION: ICD-10-CM

## 2020-02-12 DIAGNOSIS — M48.062 SPINAL STENOSIS OF LUMBAR REGION WITH NEUROGENIC CLAUDICATION: ICD-10-CM

## 2020-02-12 DIAGNOSIS — M96.1 LUMBAR POST-LAMINECTOMY SYNDROME: ICD-10-CM

## 2020-02-12 RX ORDER — TOPIRAMATE 25 MG/1
TABLET ORAL
Qty: 90 TAB | Refills: 1 | Status: SHIPPED | OUTPATIENT
Start: 2020-02-12 | End: 2020-10-15 | Stop reason: SDUPTHER

## 2020-02-12 RX ORDER — LEVOTHYROXINE SODIUM 25 UG/1
25 TABLET ORAL
COMMUNITY
Start: 2020-01-23

## 2020-02-12 RX ORDER — DULOXETIN HYDROCHLORIDE 60 MG/1
60 CAPSULE, DELAYED RELEASE ORAL DAILY
Qty: 90 CAP | Refills: 1 | Status: SHIPPED | OUTPATIENT
Start: 2020-02-12

## 2020-02-12 RX ORDER — EZETIMIBE 10 MG/1
10 TABLET ORAL
COMMUNITY
Start: 2019-09-20 | End: 2021-08-19

## 2020-02-12 NOTE — PROGRESS NOTES
Chief complaint/History of Present Illness:  Chief Complaint   Patient presents with    Back Pain     med refill     MANNIE Harris is a  78 y.o.  female      HISTORY OF PRESENT ILLNESS:  The patient comes in today for followup of her chronic low back pain with radiating left lower extremity pain down to her ankle. She states she is not really having any pain right now. It is controlled with medication. She takes Topamax 25 mg nightly and Cymbalta 60 mg once a day. She does not have any side effects from it, and it controls her pain. She rates her pain as a 0/10 today. Since we last saw her, she did have a cholecystectomy in November 2019 and is currently having some anemia problems and is undergoing a colonoscopy soon for that. She states she will take Tylenol two tablets if her pain in her back acts up, but she is only taking it about once every three weeks. She denies fever and bowel and bladder dysfunction. PHYSICAL EXAM:  Ms. Princess Iqbal is a 66-year-old female. She is alert and oriented. She has a normal mood and affect. She has a full weightbearing, non-antalgic gait using no assistive device. She has 4/5 strength of the bilateral lower extremities and negative straight leg raise. She has no pain with hyperextension of the lumbar spine. ASSESSMENT/PLAN:  This is a patient who has back pain with radiating left lower extremity pain that is controlled with her Cymbalta and her Topamax. We have refilled these for her today. She does have degenerative disc disease and spinal stenosis. She has had lumbar surgery in the past.  We will see her back in six months or sooner if needed.         Review of systems:    Past Medical History:   Diagnosis Date    Arrhythmia     Cardiac disease     Hypercholesteremia     Hyperlipidemia     Hypertension     Pacemaker      Past Surgical History:   Procedure Laterality Date    BREAST SURGERY PROCEDURE UNLISTED Bilateral     bilateral breast reducution post procedure GUHGQXEX 325 cyst right breast removed    CARDIAC SURG PROCEDURE UNLIST      coronary artery bypass graft, coronary stent placement     Social History     Socioeconomic History    Marital status:      Spouse name: Not on file    Number of children: Not on file    Years of education: Not on file    Highest education level: Not on file   Occupational History    Not on file   Social Needs    Financial resource strain: Not on file    Food insecurity:     Worry: Not on file     Inability: Not on file    Transportation needs:     Medical: Not on file     Non-medical: Not on file   Tobacco Use    Smoking status: Former Smoker     Packs/day: 0.50     Years: 30.00     Pack years: 15.00     Last attempt to quit: 3/21/2009     Years since quitting: 10.9    Smokeless tobacco: Never Used   Substance and Sexual Activity    Alcohol use: No    Drug use: No    Sexual activity: Not on file   Lifestyle    Physical activity:     Days per week: Not on file     Minutes per session: Not on file    Stress: Not on file   Relationships    Social connections:     Talks on phone: Not on file     Gets together: Not on file     Attends Baptism service: Not on file     Active member of club or organization: Not on file     Attends meetings of clubs or organizations: Not on file     Relationship status: Not on file    Intimate partner violence:     Fear of current or ex partner: Not on file     Emotionally abused: Not on file     Physically abused: Not on file     Forced sexual activity: Not on file   Other Topics Concern    Not on file   Social History Narrative    Not on file     Family History   Problem Relation Age of Onset    Hypertension Mother     Coronary Artery Disease Mother     Hypertension Father     Coronary Artery Disease Brother     Stroke Maternal Grandmother        Physical Exam:  Visit Vitals  /72   Pulse 86   Temp 98.1 °F (36.7 °C)   Resp 16   Wt 150 lb (68 kg) SpO2 99%   BMI 26.57 kg/m²     Pain Scale: 0 - No pain/10       has been . reviewed and is appropriate          Diagnoses and all orders for this visit:    1. DDD (degenerative disc disease), lumbar  -     topiramate (TOPAMAX) 25 mg tablet; take 1 tablet by mouth nightly  -     DULoxetine (CYMBALTA) 60 mg capsule; Take 1 Cap by mouth daily. 2. Spinal stenosis of lumbar region with neurogenic claudication  -     topiramate (TOPAMAX) 25 mg tablet; take 1 tablet by mouth nightly  -     DULoxetine (CYMBALTA) 60 mg capsule; Take 1 Cap by mouth daily. 3. Radiculopathy, lumbar region  -     topiramate (TOPAMAX) 25 mg tablet; take 1 tablet by mouth nightly  -     DULoxetine (CYMBALTA) 60 mg capsule; Take 1 Cap by mouth daily. 4. Lumbar post-laminectomy syndrome  -     topiramate (TOPAMAX) 25 mg tablet; take 1 tablet by mouth nightly  -     DULoxetine (CYMBALTA) 60 mg capsule; Take 1 Cap by mouth daily. Follow-up and Dispositions    · Return in about 6 months (around 8/12/2020) for with NP.              We have informed Ton Doe to notify us for immediate appointment if she has any worsening neurogical symptoms or if an emergency situation presents, then call 911

## 2020-05-04 ENCOUNTER — VIRTUAL VISIT (OUTPATIENT)
Dept: ORTHOPEDIC SURGERY | Age: 80
End: 2020-05-04

## 2020-05-04 DIAGNOSIS — G03.9 ARACHNOIDITIS: ICD-10-CM

## 2020-05-04 DIAGNOSIS — M54.16 LUMBAR NEURITIS: ICD-10-CM

## 2020-05-04 DIAGNOSIS — M48.062 SPINAL STENOSIS OF LUMBAR REGION WITH NEUROGENIC CLAUDICATION: Primary | ICD-10-CM

## 2020-05-04 DIAGNOSIS — M51.36 DDD (DEGENERATIVE DISC DISEASE), LUMBAR: ICD-10-CM

## 2020-05-04 DIAGNOSIS — M96.1 LUMBAR POST-LAMINECTOMY SYNDROME: ICD-10-CM

## 2020-05-04 NOTE — PROGRESS NOTES
[facility]  [address], [place]   Phone: [phone]  Fax: [phone]        PROGRESS NOTE    CONSENT:  Pursuant to the emergency declaration under the [address] and [facility] 1135 waiver authority and the [employer] and [employer] Act, this Virtual Visit was conducted, with patient's consent, to reduce the patient's risk of exposure to COVID-19 and provide continuity of care for an established patient. Services were unable to be provided through a video synchronous discussion virtually to substitute for in-person appointment. Subsequently, the patient was consulted through a telephone discussion. ENCOUNTER DURATION (minutes): 8    HISTORY OF PRESENT ILLNESS:  The patient is a 78 y.o. female and was consulted via telephone at the [facility] office for follow up of centralized low back pain. Additionally, she endorses neck and RUE pain. Previously, she was seen for c/o low back pain into the LLE extending in an L5 distribution to the ankle. She denies left groin pain. She notes her pain is the worst in the AM. This is a patient with a diagnosis of lumbar postlaminectomy syndrome with h/o lumbar spinal surgery from ~ 2012 performed by Dr. Jaye Estrada. Prior to her surgery, she did not have much in the way of low back pain but just LLE pain. Pt underwent lumbar blocks roughly four years ago but none recently. She was initially seen for c/o intermittent right shoulder pain extending into the RUE n a C6 distribution to the 1st digit right hand, which she reports is doing well. Pt denies any recent falls. Notes from Dr. Chris Perea were reviewed. Pt completed physical therapy for her cervical spine with relief and continues with her HEP. She reports previous shoulder injection without relief.  Pt completed physical therapy with slight relief. Pt is completing her HEP regularly.  She was treated with Percocet with minimal relief. Pt is no longer taking Aleve. Pt completed Medrol Dosepak without benefit. Pt reports nervousness with increased dose of NEURONTIN 600 mg TID. Pt underwent L4/5 epidural on 5/23/17 with good relief. Pt underwent L4/5 lumbar epidural on 10/6/17 with mild relief. Pt reports some relief following blocks. Pt denies fever, weight loss, or skin changes. Pt denies history of glaucoma, DM, or seizures. Of note, patient has a pacemaker and is not a candidate for an MRI. Cervical spine XR from 1/16/16 was reviewed. Per report there was no acute fracture or dislocation cervical spine. There was degenerative discogenic changes C4-C7, most prominent at C5-C6, slightly progressed from comparison study. Cervical spine CT from 2/10/16 was reviewed. Per report, there was multilevel central stenosis due to disc osteophyte complex involving C4-C5 through C6-C7 levels. Stenosis is greatest at C4-C5, mild to moderate in degree. Additional mild-to-moderate C4-C5 and mild C5-C6 cord deformity is present. No high-grade central stenosis is seen. Mild-to-moderate multilevel foraminal stenosis. A RUE EMG from 2/17/16 was reviewed. Per report, it was suggestive of a chronic C7 radiculopathy on the right sided. Preliminary reading of lumbar plain films revealed posterior fusion L3-L4. Hardware intact. Degenerative disc disease at L1-L2, L2-L3 and L4-L5. Anterior osteophytes L1-2-3-4-L5. No acute pathology identified. Lumbar spine CT myelogram dated 5/12/17 reviewed. Per report, multilevel degenerative disc and degenerative facet disease with postsurgical changes at L3-4. Overall, central canal stenosis appears to be mild but the severity of the stenosis does appear to slightly increase on the upright myelographic views. Multilevel foraminal narrowing is present and is severe on the left at L4-5.  Progressive clumping and peripheral displacement of the cauda equina nerve roots extending below the L3 level, consistent with the sequela of arachnoiditis. At her last clinical appointment, she wished to continue her current treatment. I provided her with refills of her Cymbalta 60 mg daily and Topamax 25 mg qhs. I offered to increase her Topamax, pt declined. I recommended she continue with her HEP daily. She was not interested in surgical intervention or lumbar blocks at the time.      At today's telephone consultation, the patient c/o a recurrence of low back pain into the LLE in a L5 distribution to the ankle x 3/2020 without trauma. She rates her pain  9/10, previously 5/10. Patient was last seen by me on 10/17/18 and this is an earlier than planned follow up. She reports she stopped exercising in 3/2020 due to [address] and noticed an increase in discomfort a couple weeks following. Pt denies h/o DM or stomach ulcers. She reports undergoing a recent upper endoscopy for GI bleed x 1 month ago by Dr. Capo Mojica, gastroenterology. Pt denies change in bowel or bladder habits. Pt denies any signs of weakness. Note from Niels Cherry NP dated 2/12/2020 indicating patient was seen with c/o chronic low back pain into the LLE to the ankle. Indicated her pain at that time was 0/10. She provided her refills for Cymbalta 60 mg and Topamax 25 mg qhs.  reviewed. There is no height or weight on file to calculate BMI.     PCP: Mansi Olvera MD      Past Medical History:   Diagnosis Date    Arrhythmia     Cardiac disease     Hypercholesteremia     Hyperlipidemia     Hypertension     Pacemaker         Social History     Socioeconomic History    Marital status:      Spouse name: Not on file    Number of children: Not on file    Years of education: Not on file    Highest education level: Not on file   Occupational History    Not on file   Social Needs    Financial resource strain: Not on file    Food insecurity     Worry: Not on file     Inability: Not on file    Transportation needs     Medical: Not on file     Non-medical: Not on file   Tobacco Use    Smoking status: Former Smoker     Packs/day: 0.50     Years: 30.00     Pack years: 15.00     Last attempt to quit: 3/21/2009     Years since quittin.1    Smokeless tobacco: Never Used   Substance and Sexual Activity    Alcohol use: No    Drug use: No    Sexual activity: Not on file   Lifestyle    Physical activity     Days per week: Not on file     Minutes per session: Not on file    Stress: Not on file   Relationships    Social connections     Talks on phone: Not on file     Gets together: Not on file     Attends Buddhist service: Not on file     Active member of club or organization: Not on file     Attends meetings of clubs or organizations: Not on file     Relationship status: Not on file    Intimate partner violence     Fear of current or ex partner: Not on file     Emotionally abused: Not on file     Physically abused: Not on file     Forced sexual activity: Not on file   Other Topics Concern    Not on file   Social History Narrative    Not on file       Current Outpatient Medications   Medication Sig Dispense Refill    ezetimibe (ZETIA) 10 mg tablet Take 10 mg by mouth.  levothyroxine (SYNTHROID) 25 mcg tablet Take 25 mcg by mouth.  topiramate (TOPAMAX) 25 mg tablet take 1 tablet by mouth nightly 90 Tab 1    DULoxetine (CYMBALTA) 60 mg capsule Take 1 Cap by mouth daily. 90 Cap 1    COMPOUNDMAX BASE crea 240 g by Does Not Apply route four (4) times daily. Anti-Inflam Diclofenac Sodium 3%Gabapentin 3% Lidocaine 2% Prilocaine 2% 240 g 3    naproxen (NAPROSYN) 500 mg tablet take 1 tablet by mouth twice a day if needed for pain  0    amiodarone (CORDARONE) 200 mg tablet Take 200 mg by mouth daily. 0    amLODIPine (NORVASC) 10 mg tablet Take 10 mg by mouth daily. 1    losartan (COZAAR) 100 mg tablet Take 100 mg by mouth daily. 0    metoprolol succinate (TOPROL-XL) 25 mg XL tablet Take 25 mg by mouth daily.   0    potassium chloride (K-DUR, KLOR-CON) 20 mEq tablet Take 20 mEq by mouth daily as needed. 1    pravastatin (PRAVACHOL) 40 mg tablet Take 40 mg by mouth daily. 1    triamterene-hydrochlorothiazide (DYAZIDE) 37.5-25 mg per capsule Take 1 Cap by mouth every morning. 1    aspirin delayed-release 81 mg tablet Take  by mouth daily.  cholecalciferol (VITAMIN D3) 1,000 unit tablet Take  by mouth daily.  nitroglycerin (NITROSTAT) 0.4 mg SL tablet by SubLINGual route every five (5) minutes as needed for Chest Pain.  montelukast (SINGULAIR) 10 mg tablet take 1 tablet by mouth at bedtime  0    furosemide (LASIX) 20 mg tablet Take 20 mg by mouth daily as needed. 0    RANEXA 500 mg SR tablet Take 500 mg by mouth daily.  fluticasone (FLONASE) 50 mcg/actuation nasal spray 2 Sprays by Both Nostrils route daily. Allergies   Allergen Reactions    Plavix [Clopidogrel] Rash          PHYSICAL EXAMINATION  Unable to perform examination secondary to COVID-19. CONSTITUTIONAL: NAD, A&O x 3    ASSESSMENT   Diagnoses and all orders for this visit:    1. Spinal stenosis of lumbar region with neurogenic claudication    2. Lumbar post-laminectomy syndrome    3. Lumbar neuritis    4. DDD (degenerative disc disease), lumbar    5. Arachnoiditis        IMPRESSION AND PLAN:  The patient consented to the tele health visit and was aware that there would be a charge. During today's telephone encounter patient had c/o a recurrence of low back pain into the LLE in a L5 distribution to the ankle x 3/2020 without trauma. Multiple treatment options were discussed. Pt appears to be neurologically intact. I offered to increase her Topamax, pt declined. Pending approval by Dr. Janes Pantoja, I will start  her on a MDP. The risks, benefits, and potential side effects of this medication were discussed. Patient understands and wishes to proceed. Patient advised to call the office if intolerant to medication.  She did not require refills at this time. I recommended she resume her HEP. I will see the patient back in 1 month's time or earlier if needed. Written by Marina Quesada, as dictated by Cher Benoit MD  I examined the patient, reviewed and agree with the note.

## 2020-05-05 ENCOUNTER — TELEPHONE (OUTPATIENT)
Dept: ORTHOPEDIC SURGERY | Age: 80
End: 2020-05-05

## 2020-05-05 NOTE — TELEPHONE ENCOUNTER
Called Dr. Daniele Shepard office to see if it is ok to start the patient on a MDP. Orly Love is out of the office today but will be back tomorrow.

## 2020-05-06 DIAGNOSIS — M54.16 RADICULOPATHY, LUMBAR REGION: ICD-10-CM

## 2020-05-06 DIAGNOSIS — M51.36 DDD (DEGENERATIVE DISC DISEASE), LUMBAR: ICD-10-CM

## 2020-05-06 DIAGNOSIS — M96.1 LUMBAR POST-LAMINECTOMY SYNDROME: ICD-10-CM

## 2020-05-06 DIAGNOSIS — M48.062 SPINAL STENOSIS OF LUMBAR REGION WITH NEUROGENIC CLAUDICATION: ICD-10-CM

## 2020-05-06 RX ORDER — DULOXETIN HYDROCHLORIDE 60 MG/1
CAPSULE, DELAYED RELEASE ORAL
Qty: 90 CAP | Refills: 1 | OUTPATIENT
Start: 2020-05-06

## 2020-05-06 RX ORDER — METHYLPREDNISOLONE 4 MG/1
TABLET ORAL
Qty: 1 DOSE PACK | Refills: 0 | Status: SHIPPED | OUTPATIENT
Start: 2020-05-06 | End: 2021-08-19

## 2020-05-06 RX ORDER — TOPIRAMATE 25 MG/1
TABLET ORAL
Qty: 90 TAB | Refills: 1 | OUTPATIENT
Start: 2020-05-06

## 2020-05-06 NOTE — TELEPHONE ENCOUNTER
She was referring to the [facility] dos pk by dr Mauri Mosley yesterday. It was escribed this morning. Pt notified.     No further action required

## 2020-05-06 NOTE — TELEPHONE ENCOUNTER
Spoke to Danial Lennox from Dr. Effie Downey office. Dr. Nagel Co states that a MDP is ok for a GI standpoint. Medication has been sent to the pharmacy and patient is aware.

## 2020-06-04 ENCOUNTER — VIRTUAL VISIT (OUTPATIENT)
Dept: ORTHOPEDIC SURGERY | Age: 80
End: 2020-06-04

## 2020-06-04 DIAGNOSIS — M96.1 LUMBAR POST-LAMINECTOMY SYNDROME: ICD-10-CM

## 2020-06-04 DIAGNOSIS — M51.36 DDD (DEGENERATIVE DISC DISEASE), LUMBAR: ICD-10-CM

## 2020-06-04 DIAGNOSIS — G03.9 ARACHNOIDITIS: ICD-10-CM

## 2020-06-04 DIAGNOSIS — M54.16 LUMBAR NEURITIS: ICD-10-CM

## 2020-06-04 DIAGNOSIS — M48.062 SPINAL STENOSIS OF LUMBAR REGION WITH NEUROGENIC CLAUDICATION: Primary | ICD-10-CM

## 2020-06-04 RX ORDER — DULOXETIN HYDROCHLORIDE 60 MG/1
60 CAPSULE, DELAYED RELEASE ORAL DAILY
Qty: 90 CAP | Refills: 1 | Status: SHIPPED | OUTPATIENT
Start: 2020-06-04 | End: 2021-01-24

## 2020-06-04 RX ORDER — TOPIRAMATE 50 MG/1
50 TABLET, FILM COATED ORAL DAILY
Qty: 30 TAB | Refills: 1 | Status: SHIPPED | OUTPATIENT
Start: 2020-06-04 | End: 2020-10-15 | Stop reason: SDUPTHER

## 2020-06-04 NOTE — PROGRESS NOTES
[facility]  [address], [address]   Phone: [phone]  Fax: [phone]        PROGRESS NOTE    CONSENT:  Pursuant to the emergency declaration under the [address] and [facility] 1135 waiver authority and the [employer] and [employer] Act, this Virtual Visit was conducted, with patient's consent, to reduce the patient's risk of exposure to COVID-19 and provide continuity of care for an established patient. Services were unable to be provided through a video synchronous discussion virtually to substitute for in-person appointment. Subsequently, the patient was consulted through a telephone discussion. ENCOUNTER DURATION: 11 minutes 18 seconds      HISTORY OF PRESENT ILLNESS:  The patient is a 78 y.o. female and is being consulted via telephone at the [facility] office for follow up of  a recurrence of low back pain into the LLE in a L5 distribution to the ankle x 3/2020 without trauma. Previously, she was seen for centralized low back pain. Additionally, she endorsed some neck and RUE pain. Previously, she was seen for c/o low back pain into the LLE extending in an L5 distribution to the ankle. She denies left groin pain. She notes her pain is the worst in the AM. This is a patient with a diagnosis of lumbar postlaminectomy syndrome with h/o lumbar spinal surgery from ~ 2012 performed by Dr. Selena Arroyo. Prior to her surgery, she did not have much in the way of low back pain but just LLE pain. Pt underwent lumbar blocks roughly four years ago but none recently. She was initially seen for c/o intermittent right shoulder pain extending into the RUE n a C6 distribution to the 1st digit right hand, which she reports is doing well. Pt denies any recent falls. Notes from Dr. Melecio Hernandez were reviewed. Pt completed physical therapy for her cervical spine with relief and continues with her HEP.  She reports previous shoulder injection without relief.  Pt completed physical therapy with slight relief. Pt is completing her HEP regularly. She was treated with Percocet with minimal relief. Pt is no longer taking Aleve. Pt completed Medrol Dosepak without benefit. Pt reports nervousness with increased dose of NEURONTIN 600 mg TID. Pt underwent L4/5 epidural on 5/23/17 with good relief. Pt underwent L4/5 lumbar epidural on 10/6/17 with mild relief. Pt reports some relief following blocks. Pt denies fever, weight loss, or skin changes. Pt denies history of glaucoma, DM, or seizures. Of note, patient has a pacemaker and is not a candidate for an MRI. She reports undergoing a recent upper endoscopy for GI bleed in 4/2020 by Dr. Capo Mojica, gastroenterology. Note from Niels Cherry NP dated 2/12/2020 indicating patient was seen with c/o chronic low back pain into the LLE to the ankle. Indicated her pain at that time was 0/10. She provided her refills for Cymbalta 60 mg and Topamax 25 mg qhs. Cervical spine XR from 1/16/16 was reviewed. Per report there was no acute fracture or dislocation cervical spine. There was degenerative discogenic changes C4-C7, most prominent at C5-C6, slightly progressed from comparison study. Cervical spine CT from 2/10/16 was reviewed. Per report, there was multilevel central stenosis due to disc osteophyte complex involving C4-C5 through C6-C7 levels. Stenosis is greatest at C4-C5, mild to moderate in degree. Additional mild-to-moderate C4-C5 and mild C5-C6 cord deformity is present. No high-grade central stenosis is seen. Mild-to-moderate multilevel foraminal stenosis. A RUE EMG from 2/17/16 was reviewed. Per report, it was suggestive of a chronic C7 radiculopathy on the right sided. Preliminary reading of lumbar plain films revealed posterior fusion L3-L4. Hardware intact. Degenerative disc disease at L1-L2, L2-L3 and L4-L5. Anterior osteophytes L1-2-3-4-L5. No acute pathology identified. Lumbar spine CT myelogram dated 5/12/17 reviewed. Per report, multilevel degenerative disc and degenerative facet disease with postsurgical changes at L3-4. Overall, central canal stenosis appears to be mild but the severity of the stenosis does appear to slightly increase on the upright myelographic views. Multilevel foraminal narrowing is present and is severe on the left at L4-5. Progressive clumping and peripheral displacement of the cauda equina nerve roots extending below the L3 level, consistent with the sequela of arachnoiditis. At her last clinical appointment,  I offered to increase her Topamax, pt declined. She did not require refills. Pending approval by Dr. Dave Sharma, I started her on a MDP. I recommended she resume her HEP. At today's telephone consultation, the patient c/o low back pain into the BLE in a S1 distribution. She rates her pain 5/10, previously 9/10. She completed the MDP with good relief. She is compliant with her Topamax 25 mg qhs and Cymbalta 60 mg daily. She is compliant with her HEP. Pt denies change in bowel or bladder habits. Pt denies any signs of weakness.  reviewed. There is no height or weight on file to calculate BMI.     PCP: Domi Sood MD      Past Medical History:   Diagnosis Date    Arrhythmia     Cardiac disease     Hypercholesteremia     Hyperlipidemia     Hypertension     Pacemaker         Social History     Socioeconomic History    Marital status:      Spouse name: Not on file    Number of children: Not on file    Years of education: Not on file    Highest education level: Not on file   Occupational History    Not on file   Social Needs    Financial resource strain: Not on file    Food insecurity     Worry: Not on file     Inability: Not on file    Transportation needs     Medical: Not on file     Non-medical: Not on file   Tobacco Use    Smoking status: Former Smoker     Packs/day: 0.50     Years: 30.00 Pack years: 15.00     Last attempt to quit: 3/21/2009     Years since quittin.2    Smokeless tobacco: Never Used   Substance and Sexual Activity    Alcohol use: No    Drug use: No    Sexual activity: Not on file   Lifestyle    Physical activity     Days per week: Not on file     Minutes per session: Not on file    Stress: Not on file   Relationships    Social connections     Talks on phone: Not on file     Gets together: Not on file     Attends Hinduism service: Not on file     Active member of club or organization: Not on file     Attends meetings of clubs or organizations: Not on file     Relationship status: Not on file    Intimate partner violence     Fear of current or ex partner: Not on file     Emotionally abused: Not on file     Physically abused: Not on file     Forced sexual activity: Not on file   Other Topics Concern    Not on file   Social History Narrative    Not on file       Current Outpatient Medications   Medication Sig Dispense Refill    topiramate (TOPAMAX) 50 mg tablet Take 1 Tab by mouth daily. 30 Tab 1    DULoxetine (CYMBALTA) 60 mg capsule Take 1 Cap by mouth daily. 90 Cap 1    topiramate (TOPAMAX) 25 mg tablet take 1 tablet by mouth nightly 90 Tab 1    DULoxetine (CYMBALTA) 60 mg capsule Take 1 Cap by mouth daily. 90 Cap 1    naproxen (NAPROSYN) 500 mg tablet take 1 tablet by mouth twice a day if needed for pain  0    amiodarone (CORDARONE) 200 mg tablet Take 200 mg by mouth daily. 0    amLODIPine (NORVASC) 10 mg tablet Take 10 mg by mouth daily. 1    losartan (COZAAR) 100 mg tablet Take 100 mg by mouth daily. 0    potassium chloride (K-DUR, KLOR-CON) 20 mEq tablet Take 20 mEq by mouth daily as needed. 1    pravastatin (PRAVACHOL) 40 mg tablet Take 40 mg by mouth daily. 1    RANEXA 500 mg SR tablet Take 500 mg by mouth daily.  triamterene-hydrochlorothiazide (DYAZIDE) 37.5-25 mg per capsule Take 1 Cap by mouth every morning.   1    aspirin delayed-release 81 mg tablet Take  by mouth daily.  cholecalciferol (VITAMIN D3) 1,000 unit tablet Take  by mouth daily.  nitroglycerin (NITROSTAT) 0.4 mg SL tablet by SubLINGual route every five (5) minutes as needed for Chest Pain.  methylPREDNISolone (MEDROL DOSEPACK) 4 mg tablet Per dose pack instructions 1 Dose Pack 0    ezetimibe (ZETIA) 10 mg tablet Take 10 mg by mouth.  levothyroxine (SYNTHROID) 25 mcg tablet Take 25 mcg by mouth.  montelukast (SINGULAIR) 10 mg tablet take 1 tablet by mouth at bedtime  0    COMPOUNDMAX BASE crea 240 g by Does Not Apply route four (4) times daily. Anti-Inflam Diclofenac Sodium 3%Gabapentin 3% Lidocaine 2% Prilocaine 2% 240 g 3    furosemide (LASIX) 20 mg tablet Take 20 mg by mouth daily as needed. 0    metoprolol succinate (TOPROL-XL) 25 mg XL tablet Take 25 mg by mouth daily. 0    fluticasone (FLONASE) 50 mcg/actuation nasal spray 2 Sprays by Both Nostrils route daily. Allergies   Allergen Reactions    Plavix [Clopidogrel] Rash          PHYSICAL EXAMINATION  Unable to perform examination secondary to COVID-19. CONSTITUTIONAL: NAD, A&O x 3    ASSESSMENT   Diagnoses and all orders for this visit:    1. Spinal stenosis of lumbar region with neurogenic claudication  -     topiramate (TOPAMAX) 50 mg tablet; Take 1 Tab by mouth daily. 2. Lumbar post-laminectomy syndrome  -     topiramate (TOPAMAX) 50 mg tablet; Take 1 Tab by mouth daily. 3. Lumbar neuritis  -     topiramate (TOPAMAX) 50 mg tablet; Take 1 Tab by mouth daily. 4. DDD (degenerative disc disease), lumbar  -     topiramate (TOPAMAX) 50 mg tablet; Take 1 Tab by mouth daily. 5. Arachnoiditis  -     topiramate (TOPAMAX) 50 mg tablet; Take 1 Tab by mouth daily. Other orders  -     DULoxetine (CYMBALTA) 60 mg capsule; Take 1 Cap by mouth daily. IMPRESSION AND PLAN:  The patient consented to the tele health visit and was aware that there would be a charge.  During today's TeleVisit patient had c/o  low back pain into the BLE in a S1 distribution. Multiple treatment options were discussed. I will increase her Topamax from 25 mg qhs to 75 mg qhs. Patient advised to call the office if intolerant to higher dose. I provided her a refill of Cymbalta 60 mg daily. I encouraged her to continue to perform her daily HEP. Pt appears to be neurologically intact. I will see the patient back in 1 month's time or earlier if needed. Written by Estela Dubois, as dictated by Dominic Smith MD  I examined the patient, reviewed and agree with the note.

## 2020-07-01 NOTE — PROGRESS NOTES
[facility]  [address], [place]   Phone: [phone]  Fax: [phone]        PROGRESS NOTE      HISTORY OF PRESENT ILLNESS:  The patient is a 78 y.o. female and was seen today for follow up of low back pain radiating into the BLE in a S1 distribution to the ankle. Previously, she was seen for a recurrence of low back pain into the LLE in a L5 distribution to the ankle x 3/2020 without trauma. Previously, she was seen for centralized low back pain. Additionally, she endorsed some neck and RUE pain. Previously, she was seen for c/o low back pain into the LLE radiating in an L5 distribution to the ankle. She denies left groin pain. She notes her pain is the worst in the AM. This is a patient with a diagnosis of lumbar postlaminectomy syndrome with h/o lumbar spinal surgery from ~ 2012 performed by Dr. Frida Colon. Prior to her surgery, she did not have much in the way of low back pain but just LLE pain. Pt underwent lumbar blocks roughly four years ago but none recently. She was initially seen for c/o intermittent right shoulder pain extending into the RUE n a C6 distribution to the 1st digit right hand, which she reports is doing well. Pt denies any recent falls. Notes from Dr. Edilma Casillas were reviewed. Pt completed physical therapy for her cervical spine with relief and continues with her HEP. She reports previous shoulder injection without relief. She was treated with Percocet with minimal relief. Pt is no longer taking Aleve. Pt completed Medrol Dosepak without benefit. Pt reports nervousness with increased dose of NEURONTIN 600 mg TID. Pt underwent L4/5 epidural on 5/23/17 with good relief. Pt underwent L4/5 lumbar epidural on 10/6/17 with mild relief. Pt reports some relief following blocks. Pt denies fever, weight loss, or skin changes. Pt denies history of glaucoma, DM, or seizures. Of note, patient has a pacemaker and is not a candidate for an MRI.  She reports undergoing a recent upper endoscopy for GI bleed in 4/2020 by Dr. Lawyer Canales, gastroenterology. Note from Lenore Andrade NP dated 2/12/2020 indicating patient was seen with c/o chronic low back pain into the LLE to the ankle. Indicated her pain at that time was 0/10. She provided her refills for Cymbalta 60 mg and Topamax 25 mg qhs. Cervical spine XR from 1/16/16 was reviewed. Per report there was no acute fracture or dislocation cervical spine. There was degenerative discogenic changes C4-C7, most prominent at C5-C6, slightly progressed from comparison study. Cervical spine CT from 2/10/16 was reviewed. Per report, there was multilevel central stenosis due to disc osteophyte complex involving C4-C5 through C6-C7 levels. Stenosis is greatest at C4-C5, mild to moderate in degree. Additional mild-to-moderate C4-C5 and mild C5-C6 cord deformity is present. No high-grade central stenosis is seen. Mild-to-moderate multilevel foraminal stenosis. A RUE EMG from 2/17/16 was reviewed. Per report, it was suggestive of a chronic C7 radiculopathy on the right sided. Preliminary reading of lumbar plain films revealed posterior fusion L3-L4. Hardware intact. Degenerative disc disease at L1-L2, L2-L3 and L4-L5. Anterior osteophytes L1-2-3-4-L5. No acute pathology identified. Lumbar spine CT myelogram dated 5/12/17 reviewed. Per report, multilevel degenerative disc and degenerative facet disease with postsurgical changes at L3-4. Overall, central canal stenosis appears to be mild but the severity of the stenosis does appear to slightly increase on the upright myelographic views. Multilevel foraminal narrowing is present and is severe on the left at L4-5. Progressive clumping and peripheral displacement of the cauda equina nerve roots extending below the L3 level, consistent with the sequela of arachnoiditis. At her last clinical appointment, I increased her Topamax from 25 mg qhs to 50 mg qhs.  I provided her a refill of Cymbalta 60 mg daily. I encouraged her to continue to perform her daily HEP.     The patient returns today with pain location and distribution remains unchanged. She rates her pain 5-6/10, previously 5/10. She is tolerating the increased dose of Topamax 50 mg qhs without benefit. She is compliant with the Cymbalta 60 mg daily. She is compliant with her HEP. Pt denies change in bowel or bladder habits.  reviewed. Body mass index is 27.6 kg/m².     PCP: Rossi Sarkar MD      Past Medical History:   Diagnosis Date    Arrhythmia     Cardiac disease     Hypercholesteremia     Hyperlipidemia     Hypertension     Pacemaker         Social History     Socioeconomic History    Marital status:      Spouse name: Not on file    Number of children: Not on file    Years of education: Not on file    Highest education level: Not on file   Occupational History    Not on file   Social Needs    Financial resource strain: Not on file    Food insecurity     Worry: Not on file     Inability: Not on file    Transportation needs     Medical: Not on file     Non-medical: Not on file   Tobacco Use    Smoking status: Former Smoker     Packs/day: 0.50     Years: 30.00     Pack years: 15.00     Last attempt to quit: 3/21/2009     Years since quittin.3    Smokeless tobacco: Never Used   Substance and Sexual Activity    Alcohol use: No    Drug use: No    Sexual activity: Not on file   Lifestyle    Physical activity     Days per week: Not on file     Minutes per session: Not on file    Stress: Not on file   Relationships    Social connections     Talks on phone: Not on file     Gets together: Not on file     Attends Yazidism service: Not on file     Active member of club or organization: Not on file     Attends meetings of clubs or organizations: Not on file     Relationship status: Not on file    Intimate partner violence     Fear of current or ex partner: Not on file     Emotionally abused: Not on file     Physically abused: Not on file     Forced sexual activity: Not on file   Other Topics Concern    Not on file   Social History Narrative    Not on file       Current Outpatient Medications   Medication Sig Dispense Refill    topiramate (TOPAMAX) 50 mg tablet Take 1 Tab by mouth nightly. 90 Tab 0    topiramate (TOPAMAX) 50 mg tablet Take 1 Tab by mouth daily. 30 Tab 1    DULoxetine (CYMBALTA) 60 mg capsule Take 1 Cap by mouth daily. 90 Cap 1    ezetimibe (ZETIA) 10 mg tablet Take 10 mg by mouth.  levothyroxine (SYNTHROID) 25 mcg tablet Take 25 mcg by mouth.  DULoxetine (CYMBALTA) 60 mg capsule Take 1 Cap by mouth daily. 90 Cap 1    naproxen (NAPROSYN) 500 mg tablet take 1 tablet by mouth twice a day if needed for pain  0    amiodarone (CORDARONE) 200 mg tablet Take 200 mg by mouth daily. 0    amLODIPine (NORVASC) 10 mg tablet Take 10 mg by mouth daily. 1    losartan (COZAAR) 100 mg tablet Take 100 mg by mouth daily. 0    metoprolol succinate (TOPROL-XL) 25 mg XL tablet Take 25 mg by mouth daily. 0    potassium chloride (K-DUR, KLOR-CON) 20 mEq tablet Take 20 mEq by mouth daily as needed. 1    pravastatin (PRAVACHOL) 40 mg tablet Take 40 mg by mouth daily. 1    RANEXA 500 mg SR tablet Take 500 mg by mouth daily.  triamterene-hydrochlorothiazide (DYAZIDE) 37.5-25 mg per capsule Take 1 Cap by mouth every morning. 1    aspirin delayed-release 81 mg tablet Take  by mouth daily.  cholecalciferol (VITAMIN D3) 1,000 unit tablet Take  by mouth daily.  nitroglycerin (NITROSTAT) 0.4 mg SL tablet by SubLINGual route every five (5) minutes as needed for Chest Pain.       methylPREDNISolone (MEDROL DOSEPACK) 4 mg tablet Per dose pack instructions (Patient not taking: Reported on 7/6/2020) 1 Dose Pack 0    topiramate (TOPAMAX) 25 mg tablet take 1 tablet by mouth nightly (Patient not taking: Reported on 7/6/2020) 90 Tab 1    montelukast (SINGULAIR) 10 mg tablet take 1 tablet by mouth at bedtime  0    COMPOUNDMAX BASE crea 240 g by Does Not Apply route four (4) times daily. Anti-Inflam Diclofenac Sodium 3%Gabapentin 3% Lidocaine 2% Prilocaine 2% (Patient not taking: Reported on 7/6/2020) 240 g 3    furosemide (LASIX) 20 mg tablet Take 20 mg by mouth daily as needed. 0    fluticasone (FLONASE) 50 mcg/actuation nasal spray 2 Sprays by Both Nostrils route daily. Allergies   Allergen Reactions    Plavix [Clopidogrel] Rash          PHYSICAL EXAMINATION    Visit Vitals  /67 (BP 1 Location: Left arm, BP Patient Position: Sitting)   Pulse 68   Temp 98.1 °F (36.7 °C) (Temporal)   Ht 5' 3\" (1.6 m)   Wt 155 lb 12.8 oz (70.7 kg)   SpO2 98%   BMI 27.60 kg/m²       CONSTITUTIONAL: NAD, A&O x 3  SENSATION: Intact to light touch throughout  RANGE OF MOTION: The patient has full passive range of motion in all four extremities. MOTOR:  Straight Leg Raise: Negative, bilateral                 Hip Flex Knee Ext Knee Flex Ankle DF GTE Ankle PF Tone   Right +4/5 +4/5 +4/5 +4/5 +4/5 +4/5 +4/5   Left +4/5 +4/5 +4/5 +4/5 +4/5 +4/5 +4/5       ASSESSMENT   Diagnoses and all orders for this visit:    1. Spinal stenosis of lumbar region with neurogenic claudication  -     topiramate (TOPAMAX) 50 mg tablet; Take 1 Tab by mouth nightly. 2. Lumbar post-laminectomy syndrome  -     topiramate (TOPAMAX) 50 mg tablet; Take 1 Tab by mouth nightly. 3. Lumbar neuritis  -     topiramate (TOPAMAX) 50 mg tablet; Take 1 Tab by mouth nightly. 4. DDD (degenerative disc disease), lumbar  -     topiramate (TOPAMAX) 50 mg tablet; Take 1 Tab by mouth nightly. 5. Arachnoiditis  -     topiramate (TOPAMAX) 50 mg tablet; Take 1 Tab by mouth nightly. IMPRESSION AND PLAN:  Patient returns to the office today with c/o low back pain radiating into the BLE in a S1 distribution to the ankle. Multiple treatment options were discussed. Patient wished to continue her current treatment.  I provided her refills of Topamax 50 mg qhs. She did not require refills of Cymbalta 60 mg daily at this time. I encouraged her to continue to perform her daily HEP. Patient is neurologically intact. I will see the patient back in 3 month's time or earlier if needed. Written by Marjan Meyers, as dictated by Alberto Arguelles MD  I examined the patient, reviewed and agree with the note.

## 2020-07-06 ENCOUNTER — OFFICE VISIT (OUTPATIENT)
Dept: ORTHOPEDIC SURGERY | Age: 80
End: 2020-07-06

## 2020-07-06 VITALS
OXYGEN SATURATION: 98 % | WEIGHT: 155.8 LBS | SYSTOLIC BLOOD PRESSURE: 139 MMHG | DIASTOLIC BLOOD PRESSURE: 67 MMHG | HEIGHT: 63 IN | HEART RATE: 68 BPM | TEMPERATURE: 98.1 F | BODY MASS INDEX: 27.61 KG/M2

## 2020-07-06 DIAGNOSIS — G03.9 ARACHNOIDITIS: ICD-10-CM

## 2020-07-06 DIAGNOSIS — M96.1 LUMBAR POST-LAMINECTOMY SYNDROME: ICD-10-CM

## 2020-07-06 DIAGNOSIS — M48.062 SPINAL STENOSIS OF LUMBAR REGION WITH NEUROGENIC CLAUDICATION: Primary | ICD-10-CM

## 2020-07-06 DIAGNOSIS — M54.16 LUMBAR NEURITIS: ICD-10-CM

## 2020-07-06 DIAGNOSIS — M51.36 DDD (DEGENERATIVE DISC DISEASE), LUMBAR: ICD-10-CM

## 2020-07-06 RX ORDER — TOPIRAMATE 50 MG/1
50 TABLET, FILM COATED ORAL
Qty: 90 TAB | Refills: 0 | Status: SHIPPED | OUTPATIENT
Start: 2020-07-06 | End: 2020-10-15 | Stop reason: SDUPTHER

## 2020-07-06 NOTE — LETTER
7/6/20 Patient: Julieta Corado YOB: 1940 Date of Visit: 7/6/2020 Catrachita Dorman MD 
[address] [address] [address] [facility] [address] VIA Facsimile: [phone] Dear Catrachita Dorman MD, Thank you for referring Ms. Julieta Corado to [address] for evaluation. My notes for this consultation are attached. If you have questions, please do not hesitate to call me. I look forward to following your patient along with you. Sincerely, Alem Bennett MD

## 2020-08-19 ENCOUNTER — OFFICE VISIT (OUTPATIENT)
Dept: ORTHOPEDIC SURGERY | Age: 80
End: 2020-08-19

## 2020-08-19 VITALS
SYSTOLIC BLOOD PRESSURE: 170 MMHG | HEART RATE: 70 BPM | DIASTOLIC BLOOD PRESSURE: 73 MMHG | BODY MASS INDEX: 27.63 KG/M2 | OXYGEN SATURATION: 99 % | TEMPERATURE: 97.7 F | WEIGHT: 156 LBS

## 2020-08-19 DIAGNOSIS — M51.36 DDD (DEGENERATIVE DISC DISEASE), LUMBAR: Primary | ICD-10-CM

## 2020-08-19 DIAGNOSIS — M54.16 LUMBAR RADICULOPATHY: ICD-10-CM

## 2020-08-19 DIAGNOSIS — M96.1 LUMBAR POST-LAMINECTOMY SYNDROME: ICD-10-CM

## 2020-08-19 DIAGNOSIS — M48.062 SPINAL STENOSIS OF LUMBAR REGION WITH NEUROGENIC CLAUDICATION: ICD-10-CM

## 2020-08-19 DIAGNOSIS — G03.9 ARACHNOIDITIS: ICD-10-CM

## 2020-08-19 NOTE — PROGRESS NOTES
Chief complaint   Chief Complaint   Patient presents with    Back Pain       History of Present Illness:  Iván Lux is a  78 y.o.  female who comes in today for follow-up for chronic low back pain with bilateral lower extremity pain that radiates down to her ankles. She states she is doing well and that the Topamax 20 mg at bedtime and 60 mg daily does help quite a bit. 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 She states her back pain will increase that she has been doing a lot of sweeping or scrubbing so she tries to take her time. She states on August 14, 2020 she ended up going to go see ER for neck pain and left upper extremity pain with numbness and tingling that radiate down to her fingers. They did a CT scan which showed a disc bulge at C6-7 with severe left foraminal stenosis. They gave her prednisone and her pain resolved. She denies fever bowel bladder dysfunction she denies any other new medical issues      Physical Exam: Patient is a 80-year-old female well-developed well-nourished who is alert and oriented with a normal mood and affect. She has a full weightbearing nonantalgic gait. She did not use any assistive device. She has 4 5 strength of her bilateral upper and lower extremities. Negative straight leg raise. Negative Oscar's. No pain with hyperextension lumbar spine. She has a normal tandem gait. Assessment and Plan: This is a patient who has lumbar spinal stenosis, lumbar postlaminectomy syndrome, lumbar neuritis, degenerative disc disease, arachnoiditis and had a flare of neck pain. Her neck. The Topamax and Cymbalta are working well for her. She does not need refills of them currently but can call when she does. We will see her back in 6 months sooner if needed.         Review of systems:    Past Medical History:   Diagnosis Date    Arrhythmia     Cardiac disease     Hypercholesteremia     Hyperlipidemia     Hypertension     Pacemaker      Past Surgical History:   Procedure Laterality Date    BREAST SURGERY PROCEDURE UNLISTED Bilateral     bilateral breast reducution post procedure orderset 620 cyst right breast removed    CARDIAC SURG PROCEDURE UNLIST      coronary artery bypass graft, coronary stent placement     Social History     Socioeconomic History    Marital status:      Spouse name: Not on file    Number of children: Not on file    Years of education: Not on file    Highest education level: Not on file   Occupational History    Not on file   Social Needs    Financial resource strain: Not on file    Food insecurity     Worry: Not on file     Inability: Not on file    Transportation needs     Medical: Not on file     Non-medical: Not on file   Tobacco Use    Smoking status: Former Smoker     Packs/day: 0.50     Years: 30.00     Pack years: 15.00     Last attempt to quit: 3/21/2009     Years since quittin.4    Smokeless tobacco: Never Used   Substance and Sexual Activity    Alcohol use: No    Drug use: No    Sexual activity: Not on file   Lifestyle    Physical activity     Days per week: Not on file     Minutes per session: Not on file    Stress: Not on file   Relationships    Social connections     Talks on phone: Not on file     Gets together: Not on file     Attends Orthodox service: Not on file     Active member of club or organization: Not on file     Attends meetings of clubs or organizations: Not on file     Relationship status: Not on file    Intimate partner violence     Fear of current or ex partner: Not on file     Emotionally abused: Not on file     Physically abused: Not on file     Forced sexual activity: Not on file   Other Topics Concern    Not on file   Social History Narrative    Not on file     Family History   Problem Relation Age of Onset    Hypertension Mother     Coronary Artery Disease Mother     Hypertension Father     Coronary Artery Disease Brother     Stroke Maternal Grandmother        Physical Exam:  Visit Vitals  /73 (BP 1 Location: Left arm, BP Patient Position: Sitting)   Pulse 70   Temp 97.7 °F (36.5 °C) (Temporal)   Wt 156 lb (70.8 kg)   SpO2 99%   BMI 27.63 kg/m²     Pain Scale: 0 - No pain/10       has been . reviewed and is appropriate          Diagnoses and all orders for this visit:    1. DDD (degenerative disc disease), lumbar    2. Spinal stenosis of lumbar region with neurogenic claudication    3. Lumbar post-laminectomy syndrome    4. Arachnoiditis    5. Lumbar radiculopathy            Follow-up and Dispositions    · Return in about 6 months (around 2/19/2021) for with NP [place].              We have informed Erma Donald to notify us for immediate appointment if she has any worsening neurogical symptoms or if an emergency situation presents, then call [phone]

## 2020-10-15 DIAGNOSIS — M96.1 LUMBAR POST-LAMINECTOMY SYNDROME: ICD-10-CM

## 2020-10-15 DIAGNOSIS — M48.062 SPINAL STENOSIS OF LUMBAR REGION WITH NEUROGENIC CLAUDICATION: ICD-10-CM

## 2020-10-15 DIAGNOSIS — M51.36 DDD (DEGENERATIVE DISC DISEASE), LUMBAR: ICD-10-CM

## 2020-10-15 DIAGNOSIS — G03.9 ARACHNOIDITIS: ICD-10-CM

## 2020-10-15 DIAGNOSIS — M54.16 LUMBAR NEURITIS: ICD-10-CM

## 2020-10-15 RX ORDER — TOPIRAMATE 50 MG/1
50 TABLET, FILM COATED ORAL
Qty: 90 TAB | Refills: 0 | Status: SHIPPED | OUTPATIENT
Start: 2020-10-15 | End: 2021-08-19 | Stop reason: SDUPTHER

## 2020-10-15 NOTE — TELEPHONE ENCOUNTER
Last Visit: 8/19/20 with NP [place]  Next Appointment: 2/17/21 with NP [place]  Previous Refill Encounter(s): 7/6/20 #90    Requested Prescriptions     Pending Prescriptions Disp Refills    topiramate (TOPAMAX) 50 mg tablet 90 Tab 0     Sig: Take 1 Tab by mouth nightly.

## 2020-12-11 ENCOUNTER — OFFICE VISIT (OUTPATIENT)
Dept: ORTHOPEDIC SURGERY | Age: 80
End: 2020-12-11
Payer: MEDICARE

## 2020-12-11 VITALS
BODY MASS INDEX: 26.82 KG/M2 | OXYGEN SATURATION: 98 % | DIASTOLIC BLOOD PRESSURE: 84 MMHG | WEIGHT: 151.4 LBS | HEART RATE: 86 BPM | SYSTOLIC BLOOD PRESSURE: 170 MMHG

## 2020-12-11 DIAGNOSIS — M51.36 DDD (DEGENERATIVE DISC DISEASE), LUMBAR: ICD-10-CM

## 2020-12-11 DIAGNOSIS — M54.16 LUMBAR NEURITIS: ICD-10-CM

## 2020-12-11 DIAGNOSIS — M96.1 LUMBAR POST-LAMINECTOMY SYNDROME: ICD-10-CM

## 2020-12-11 DIAGNOSIS — G03.9 ARACHNOIDITIS: ICD-10-CM

## 2020-12-11 DIAGNOSIS — M25.571 ACUTE BILATERAL ANKLE PAIN: ICD-10-CM

## 2020-12-11 DIAGNOSIS — M48.062 SPINAL STENOSIS OF LUMBAR REGION WITH NEUROGENIC CLAUDICATION: Primary | ICD-10-CM

## 2020-12-11 DIAGNOSIS — M25.572 ACUTE BILATERAL ANKLE PAIN: ICD-10-CM

## 2020-12-11 PROCEDURE — 99214 OFFICE O/P EST MOD 30 MIN: CPT | Performed by: PHYSICAL MEDICINE & REHABILITATION

## 2020-12-11 RX ORDER — METHYLPREDNISOLONE 4 MG/1
TABLET ORAL
Qty: 1 DOSE PACK | Refills: 0 | Status: SHIPPED | OUTPATIENT
Start: 2020-12-11 | End: 2021-08-19

## 2020-12-11 RX ORDER — TOPIRAMATE 25 MG/1
TABLET ORAL
Qty: 90 TAB | Refills: 1 | Status: SHIPPED | OUTPATIENT
Start: 2020-12-11 | End: 2021-02-08 | Stop reason: SDUPTHER

## 2020-12-11 NOTE — PROGRESS NOTES
[facility]  [address], [place]   Phone: [phone]  Fax: [phone]        PROGRESS NOTE      HISTORY OF PRESENT ILLNESS:  The patient is a 78 y.o. female and was seen today for follow up of low back pain radiating into the BLE in a S1 distribution to the ankle. Previously, she was seen for a recurrence of low back pain into the LLE in a L5 distribution to the ankle x 3/2020 without trauma. Previously, she was seen for centralized low back pain. Additionally, she endorsed some neck and RUE pain. Previously, she was seen for c/o low back pain into the LLE radiating in an L5 distribution to the ankle. She denies left groin pain. She notes her pain is the worst in the AM. This is a patient with a diagnosis of lumbar postlaminectomy syndrome with h/o lumbar spinal surgery from ~ 2012 performed by Dr. Kylah Farooq. Prior to her surgery, she did not have much in the way of low back pain but just LLE pain. Pt underwent lumbar blocks roughly four years ago but none recently. She was initially seen for c/o intermittent right shoulder pain extending into the RUE n a C6 distribution to the 1st digit right hand, which she reports is doing well. Pt denies any recent falls. Notes from Dr. Gerald Mercedes were reviewed. Pt completed physical therapy for her cervical spine with relief and continues with her HEP. She reports previous shoulder injection without relief. She was treated with Percocet with minimal relief. Pt is no longer taking Aleve. Pt completed Medrol Dosepak without benefit. Pt reports nervousness with increased dose of NEURONTIN 600 mg TID. Pt underwent L4/5 epidural on 5/23/17 with good relief. Pt underwent L4/5 lumbar epidural on 10/6/17 with mild relief. Pt reports some relief following blocks. Pt denies fever, weight loss, or skin changes. Pt denies history of glaucoma, DM, or seizures. Of note, patient has a pacemaker and is not a candidate for an MRI.  She reports undergoing a recent upper endoscopy for GI bleed in 4/2020 by Dr. Maryuri Tripp, gastroenterology. Note from Lenore Valdez, [facility] 2/12/2020 indicating patient was seen with c/o chronic low back pain into the LLE to the ankle. Indicated her pain at that time was 0/10. She provided her refills for Cymbalta 60 mg and Topamax 25 mg qhs. Cervical spine XR from 1/16/16 was reviewed. Per report there was no acute fracture or dislocation cervical spine. There was degenerative discogenic changes C4-C7, most prominent at C5-C6, slightly progressed from comparison study. Cervical spine CT from 2/10/16 was reviewed. Per report, there was multilevel central stenosis due to disc osteophyte complex involving C4-C5 through C6-C7 levels. Stenosis is greatest at C4-C5, mild to moderate in degree. Additional mild-to-moderate C4-C5 and mild C5-C6 cord deformity is present. No high-grade central stenosis is seen. Mild-to-moderate multilevel foraminal stenosis. A RUE EMG from 2/17/16 was reviewed. Per report, it was suggestive of a chronic C7 radiculopathy on the right sided. Preliminary reading of lumbar plain films revealed posterior fusion L3-L4. Hardware intact. Degenerative disc disease at L1-L2, L2-L3 and L4-L5. Anterior osteophytes L1-2-3-4-L5. No acute pathology identified. Lumbar spine CT myelogram dated 5/12/17 reviewed. Per report, multilevel degenerative disc and degenerative facet disease with postsurgical changes at L3-4. Overall, central canal stenosis appears to be mild but the severity of the stenosis does appear to slightly increase on the upright myelographic views. Multilevel foraminal narrowing is present and is severe on the left at L4-5. Progressive clumping and peripheral displacement of the cauda equina nerve roots extending below the L3 level, consistent with the sequela of arachnoiditis. At her last clinical appointment, I provided her refills of Topamax 50 mg qhs.  She did not require refills of Cymbalta 60 mg daily at the time. I encouraged her to continue to perform her daily HEP. The patient returns today with and increase in low back pain radiating into the BLE, worse in the distal region of the BLE. She rates her pain 10/10, previously 5-6/10. Her pain is exacerbated by walking. This is an earlier than planned f/u. Her primary pain c/o is bilateral ankle pain which began about 2 weeks ago. She rates her low back pain radiating into the BLE, which is consistent with her prior pain complaints, a 5/10. Pt continues on Topamax 50 mg qhs and Cymbalta 60 mg daily. Pt denies change in bowel or bladder habits. Note from Gabi Alcocer NP dated 2020 indicating patient was seen for an earlier than planned f/u. She had an increase in pain. Indicated she had been to the ER for neck and arm pain. Treated with Prednisone through the ER which helped. At that time her pain was 0/10. Scheduled to f/u in 6 month's time.  reviewed. Body mass index is 26.82 kg/m².     PCP: Jennifer Kellogg MD      Past Medical History:   Diagnosis Date    Arrhythmia     Cardiac disease     Hypercholesteremia     Hyperlipidemia     Hypertension     Pacemaker         Social History     Socioeconomic History    Marital status:      Spouse name: Not on file    Number of children: Not on file    Years of education: Not on file    Highest education level: Not on file   Occupational History    Not on file   Social Needs    Financial resource strain: Not on file    Food insecurity     Worry: Not on file     Inability: Not on file    Transportation needs     Medical: Not on file     Non-medical: Not on file   Tobacco Use    Smoking status: Former Smoker     Packs/day: 0.50     Years: 30.00     Pack years: 15.00     Last attempt to quit: 3/21/2009     Years since quittin.7    Smokeless tobacco: Never Used   Substance and Sexual Activity    Alcohol use: No    Drug use: No    Sexual activity: Not on file   Lifestyle    Physical activity     Days per week: Not on file     Minutes per session: Not on file    Stress: Not on file   Relationships    Social connections     Talks on phone: Not on file     Gets together: Not on file     Attends Jewish service: Not on file     Active member of club or organization: Not on file     Attends meetings of clubs or organizations: Not on file     Relationship status: Not on file    Intimate partner violence     Fear of current or ex partner: Not on file     Emotionally abused: Not on file     Physically abused: Not on file     Forced sexual activity: Not on file   Other Topics Concern    Not on file   Social History Narrative    Not on file       Current Outpatient Medications   Medication Sig Dispense Refill    topiramate (TOPAMAX) 25 mg tablet 3 tabs PO QHS 90 Tab 1    methylPREDNISolone (MEDROL DOSEPACK) 4 mg tablet Per dose pack instructions 1 Dose Pack 0    topiramate (TOPAMAX) 50 mg tablet Take 1 Tab by mouth nightly. 90 Tab 0    DULoxetine (CYMBALTA) 60 mg capsule Take 1 Cap by mouth daily. 90 Cap 1    ezetimibe (ZETIA) 10 mg tablet Take 10 mg by mouth.  levothyroxine (SYNTHROID) 25 mcg tablet Take 25 mcg by mouth.  DULoxetine (CYMBALTA) 60 mg capsule Take 1 Cap by mouth daily. 90 Cap 1    naproxen (NAPROSYN) 500 mg tablet take 1 tablet by mouth twice a day if needed for pain  0    amiodarone (CORDARONE) 200 mg tablet Take 200 mg by mouth daily. 0    amLODIPine (NORVASC) 10 mg tablet Take 10 mg by mouth daily. 1    losartan (COZAAR) 100 mg tablet Take 100 mg by mouth daily. 0    metoprolol succinate (TOPROL-XL) 25 mg XL tablet Take 25 mg by mouth daily. 0    potassium chloride (K-DUR, KLOR-CON) 20 mEq tablet Take 20 mEq by mouth daily as needed. 1    pravastatin (PRAVACHOL) 40 mg tablet Take 40 mg by mouth daily. 1    RANEXA 500 mg SR tablet Take 500 mg by mouth daily.       triamterene-hydrochlorothiazide (DYAZIDE) 37.5-25 mg per capsule Take 1 Cap by mouth every morning. 1    aspirin delayed-release 81 mg tablet Take  by mouth daily.  cholecalciferol (VITAMIN D3) 1,000 unit tablet Take  by mouth daily.  nitroglycerin (NITROSTAT) 0.4 mg SL tablet by SubLINGual route every five (5) minutes as needed for Chest Pain.  methylPREDNISolone (MEDROL DOSEPACK) 4 mg tablet Per dose pack instructions (Patient not taking: Reported on 7/6/2020) 1 Dose Pack 0    montelukast (SINGULAIR) 10 mg tablet take 1 tablet by mouth at bedtime  0    COMPOUNDMAX BASE crea 240 g by Does Not Apply route four (4) times daily. Anti-Inflam Diclofenac Sodium 3%Gabapentin 3% Lidocaine 2% Prilocaine 2% (Patient not taking: Reported on 7/6/2020) 240 g 3    furosemide (LASIX) 20 mg tablet Take 20 mg by mouth daily as needed. 0    fluticasone (FLONASE) 50 mcg/actuation nasal spray 2 Sprays by Both Nostrils route daily. Allergies   Allergen Reactions    Plavix [Clopidogrel] Rash          PHYSICAL EXAMINATION    Visit Vitals  BP (!) 170/84 (BP 1 Location: Left arm, BP Patient Position: Sitting)   Pulse 86   Wt 151 lb 6.4 oz (68.7 kg)   SpO2 98%   BMI 26.82 kg/m²       CONSTITUTIONAL: NAD, A&O x 3  SENSATION: Intact to light touch throughout  RANGE OF MOTION: The patient has full passive range of motion in all four extremities. Increase in pain with ROM all planes of her bilateral ankles. MOTOR:  Straight Leg Raise: Negative, bilateral    Increased tenderness to direct palpation bilaterally along the lateral malleolus. Hip Flex Knee Ext Knee Flex Ankle DF GTE Ankle PF Tone   Right +4/5 +4/5 +4/5 +4/5 +4/5 +4/5 +4/5   Left +4/5 +4/5 +4/5 +4/5 +4/5 +4/5 +4/5       ASSESSMENT   Diagnoses and all orders for this visit:    1.  Spinal stenosis of lumbar region with neurogenic claudication  -     topiramate (TOPAMAX) 25 mg tablet; 3 tabs PO QHS  -     methylPREDNISolone (MEDROL DOSEPACK) 4 mg tablet; Per dose pack instructions  - REFERRAL TO ORTHOPEDICS    2. Lumbar post-laminectomy syndrome  -     topiramate (TOPAMAX) 25 mg tablet; 3 tabs PO QHS  -     methylPREDNISolone (MEDROL DOSEPACK) 4 mg tablet; Per dose pack instructions  -     REFERRAL TO ORTHOPEDICS    3. Lumbar neuritis  -     topiramate (TOPAMAX) 25 mg tablet; 3 tabs PO QHS  -     methylPREDNISolone (MEDROL DOSEPACK) 4 mg tablet; Per dose pack instructions  -     REFERRAL TO ORTHOPEDICS    4. DDD (degenerative disc disease), lumbar  -     topiramate (TOPAMAX) 25 mg tablet; 3 tabs PO QHS  -     methylPREDNISolone (MEDROL DOSEPACK) 4 mg tablet; Per dose pack instructions  -     REFERRAL TO ORTHOPEDICS    5. Arachnoiditis  -     topiramate (TOPAMAX) 25 mg tablet; 3 tabs PO QHS  -     methylPREDNISolone (MEDROL DOSEPACK) 4 mg tablet; Per dose pack instructions  -     REFERRAL TO ORTHOPEDICS    6. Acute bilateral ankle pain  -     topiramate (TOPAMAX) 25 mg tablet; 3 tabs PO QHS  -     methylPREDNISolone (MEDROL DOSEPACK) 4 mg tablet; Per dose pack instructions  -     REFERRAL TO ORTHOPEDICS      IMPRESSION AND PLAN:  Patient returns to the office today with c/o low back pain radiating into the BLE. Multiple treatment options were discussed. Her primary pain complaint is bilateral ankle pain x 2 weeks. She has increased pain with weight bearing. She has increased tenderness to direct palpation bilaterally along the lateral malleolus. She has known lumbar spinal pathology but I am unable to explain why she has increased tenderness to palpation along the lateral malleolus bilaterally from a spinal standpoint. She has increased pain with ROM all planes of her bilateral ankles. I will refer her to Dr. Fidel Moseley for further evaluation. I will increase her Topamax from 50 mg qhs to 75 mg qhs. Patient advised to call the office if intolerant to higher dose. Despite taking 2 MDP since 5/2020, due to her pain being 10/10 I will start her on Medrol Dosepak. Patient is neurologically intact.  I will see the patient back following her evaluation by Dr. Ashia De Los Santos or earlier if needed. Written by Didier Simeon, as dictated by Magda Qureshi MD  I examined the patient, reviewed and agree with the note.

## 2020-12-15 ENCOUNTER — OFFICE VISIT (OUTPATIENT)
Dept: ORTHOPEDIC SURGERY | Age: 80
End: 2020-12-15
Payer: MEDICARE

## 2020-12-15 VITALS
HEIGHT: 63 IN | WEIGHT: 150 LBS | RESPIRATION RATE: 16 BRPM | SYSTOLIC BLOOD PRESSURE: 134 MMHG | HEART RATE: 76 BPM | TEMPERATURE: 96.4 F | OXYGEN SATURATION: 96 % | BODY MASS INDEX: 26.58 KG/M2 | DIASTOLIC BLOOD PRESSURE: 67 MMHG

## 2020-12-15 DIAGNOSIS — M25.571 ACUTE RIGHT ANKLE PAIN: ICD-10-CM

## 2020-12-15 DIAGNOSIS — M76.61 ACHILLES TENDINITIS OF BOTH LOWER EXTREMITIES: Primary | ICD-10-CM

## 2020-12-15 DIAGNOSIS — M76.62 ACHILLES TENDINITIS OF BOTH LOWER EXTREMITIES: Primary | ICD-10-CM

## 2020-12-15 DIAGNOSIS — M25.572 ACUTE LEFT ANKLE PAIN: ICD-10-CM

## 2020-12-15 PROCEDURE — 1090F PRES/ABSN URINE INCON ASSESS: CPT | Performed by: ORTHOPAEDIC SURGERY

## 2020-12-15 PROCEDURE — 99213 OFFICE O/P EST LOW 20 MIN: CPT | Performed by: ORTHOPAEDIC SURGERY

## 2020-12-15 PROCEDURE — G8536 NO DOC ELDER MAL SCRN: HCPCS | Performed by: ORTHOPAEDIC SURGERY

## 2020-12-15 PROCEDURE — 73610 X-RAY EXAM OF ANKLE: CPT | Performed by: ORTHOPAEDIC SURGERY

## 2020-12-15 PROCEDURE — G8419 CALC BMI OUT NRM PARAM NOF/U: HCPCS | Performed by: ORTHOPAEDIC SURGERY

## 2020-12-15 PROCEDURE — G8432 DEP SCR NOT DOC, RNG: HCPCS | Performed by: ORTHOPAEDIC SURGERY

## 2020-12-15 PROCEDURE — 1101F PT FALLS ASSESS-DOCD LE1/YR: CPT | Performed by: ORTHOPAEDIC SURGERY

## 2020-12-15 PROCEDURE — G8400 PT W/DXA NO RESULTS DOC: HCPCS | Performed by: ORTHOPAEDIC SURGERY

## 2020-12-15 PROCEDURE — G8427 DOCREV CUR MEDS BY ELIG CLIN: HCPCS | Performed by: ORTHOPAEDIC SURGERY

## 2020-12-15 NOTE — PROGRESS NOTES
AMBULATORY PROGRESS NOTE      Patient: Florinda Rivas             MRN: [id number]     SSN: [id number] Body mass index is 26.57 kg/m². YOB: 1940     AGE: 78 y.o. EX: female    PCP: Hai Bradley MD       IMPRESSION //  DIAGNOSIS AND TREATMENT PLAN      DIAGNOSES  1. Achilles tendinitis of both lower extremities    2. Acute right ankle pain    3. Acute left ankle pain        Orders Placed This Encounter    Generic Supply Order     visco heel inserts, bilateral    AMB POC XRAY, ANKLE; COMPLETE, 3+ VIE     Order Specific Question:   Reason for Exam     Answer:   PAIN    AMB POC XRAY, ANKLE; COMPLETE, 3+ VIE     ASK ALL FEMALE PATIENTS IF THEY ARE PREGNANT     Order Specific Question:   Reason for Exam     Answer:   PAIN    REFERRAL TO PHYSICAL THERAPY     Referral Priority:   Routine     Referral Type:   PT/OT/ST     Referral Reason:   Specialty Services Required     Requested Specialty:   Physical Therapy     Number of Visits Requested:   1        Florinda Rivas has bilateral achilles tendinitis (noninsertional)    PLAN:    1. Continue Medrol Dosepak as prescribed by Dr. Eloisa Santoyo  2. Referral to PT for low frequency ultrasound and [employer]. 2. OTC Tylenol for pain. 3. DME order: visco heel inserts, bilateral   4. Anticipate MRI     RTO- 5 weeks      HPI //  OBJECTIVE EXAMINATION      Florinda Rivas IS A 78 y.o. female who presents to my outpatient office for evaluation of: spontaneous onset of bilateral hindfoot/ankle pain for two weeks. The patient denies any recent falls, twists, or trauma. Patient reports that she was recently prescribed Prednisone by Dr. Sen Cason to treat her bilateral achilles pain. She has not found much relief from the Prednisone as of yet.  She presents with a soft canvas shoe            Visit Vitals  /67   Pulse 76   Temp (!) 96.4 °F (35.8 °C) (Temporal)   Resp 16   Ht 5' 3\" (1.6 m)   Wt 150 lb (68 kg)   SpO2 96%   BMI 26.57 kg/m²       ANKLE/FOOT left    Psychiatry: Alert, oriented x 3 (name,place,time of day); speech normal in context and clarity, memory intact grossly, no involuntary movements - tremors, no dementia  Gait: slow  Tenderness: mild non-insertional Achilles   Cutaneous: mild swelling to non-insertional Achilles tendinitis (more discrete swelling on the left than right)  Joint Motion: WNL  Joint / Tendon Stability: No Ankle or Subtalar instability or joint laxity. No peroneal sublux ability or dislocation  Alignment: neutral Hindfoot, none Metatarsus Adductus Metatarsus. Neuro Motor/Sensory: NL/NL,  Vascular: NL foot/ankle pulses,   Lymphatics: No extremity lymphedema, No calf swelling, no tenderness to calf muscles. ANKLE/FOOT right    Psychiatry: Alert, oriented x 3 (name,place,time of day); speech normal in context and clarity, memory intact grossly, no involuntary movements - tremors, no dementia  Gait: slow  Tenderness: mild non-insertional Achilles   Cutaneous: mild swelling to non-insertional Achilles  Joint Motion: WNL  Joint / Tendon Stability: No Ankle or Subtalar instability or joint laxity. No peroneal sublux ability or dislocation  Alignment: neutral Hindfoot, none Metatarsus Adductus Metatarsus. Neuro Motor/Sensory: NL/NL,  Vascular: NL foot/ankle pulses,   Lymphatics: No extremity lymphedema, No calf swelling, no tenderness to calf muscles.         CHART REVIEW     Patient Active Problem List   Diagnosis Code    Pacemaker Z95.0    Cardiac disease I51.9    Hyperlipidemia E78.5    Hypercholesteremia E78.00    Cervical neuritis M54.12    DDD (degenerative disc disease), cervical M50.30    Radiculopathy, cervical region M54.12    Degenerative cervical disc M50.30    Cervical disc herniation M50.20    Spondylosis of cervical region without myelopathy or radiculopathy M47.812    Cervical pain M54.2    Loss of balance R26.89    Cervical spinal stenosis M48.02    Other cervical disc displacement, unspecified cervical region M50.20    Radiculopathy, cervical M54.12    Spondylosis without myelopathy or radiculopathy, cervical region M47.812    Spinal stenosis in cervical region M48.02    Lumbar spinal stenosis M48.061    DDD (degenerative disc disease), lumbar M51.36    Lumbar post-laminectomy syndrome M96.1    Lumbar neuritis M54.16    Low back pain with left-sided sciatica M54.42    Radiculopathy, lumbar region M54.16    Other intervertebral disc degeneration, lumbar region M51.36    Arachnoiditis G03.9        Carolina Valle has been experiencing pain and discomfort confirmed as outlined in the pain assessment outlined below. Pain Assessment  12/15/2020   Location of Pain Foot   Pain Location Comment -   Location Modifiers Right;Left   Severity of Pain 6   Quality of Pain Dull;Aching   Quality of Pain Comment -   Duration of Pain A few hours   Frequency of Pain Intermittent   Aggravating Factors Walking;Standing   Aggravating Factors Comment -   Limiting Behavior -   Relieving Factors Rest   Relieving Factors Comment -   Result of Injury No        Carolina Valle  has a past medical history of Arrhythmia, Cardiac disease, Hypercholesteremia, Hyperlipidemia, Hypertension, and Pacemaker. Patients is employed at: Retired        Past Medical History:   Diagnosis Date    Arrhythmia     Cardiac disease     Hypercholesteremia     Hyperlipidemia     Hypertension     Pacemaker      Past Surgical History:   Procedure Laterality Date    BREAST SURGERY PROCEDURE UNLISTED Bilateral     bilateral breast reducution post procedure orderset 688 cyst right breast removed    CARDIAC SURG PROCEDURE UNLIST      coronary artery bypass graft, coronary stent placement     Current Outpatient Medications   Medication Sig    methylPREDNISolone (MEDROL DOSEPACK) 4 mg tablet Per dose pack instructions    topiramate (TOPAMAX) 50 mg tablet Take 1 Tab by mouth nightly.     DULoxetine (CYMBALTA) 60 mg capsule Take 1 Cap by mouth daily.  ezetimibe (ZETIA) 10 mg tablet Take 10 mg by mouth.  levothyroxine (SYNTHROID) 25 mcg tablet Take 25 mcg by mouth.  DULoxetine (CYMBALTA) 60 mg capsule Take 1 Cap by mouth daily.  naproxen (NAPROSYN) 500 mg tablet take 1 tablet by mouth twice a day if needed for pain    amiodarone (CORDARONE) 200 mg tablet Take 200 mg by mouth daily.  amLODIPine (NORVASC) 10 mg tablet Take 10 mg by mouth daily.  losartan (COZAAR) 100 mg tablet Take 100 mg by mouth daily.  metoprolol succinate (TOPROL-XL) 25 mg XL tablet Take 25 mg by mouth daily.  potassium chloride (K-DUR, KLOR-CON) 20 mEq tablet Take 20 mEq by mouth daily as needed.  pravastatin (PRAVACHOL) 40 mg tablet Take 40 mg by mouth daily.  RANEXA 500 mg SR tablet Take 500 mg by mouth daily.  triamterene-hydrochlorothiazide (DYAZIDE) 37.5-25 mg per capsule Take 1 Cap by mouth every morning.  aspirin delayed-release 81 mg tablet Take  by mouth daily.  cholecalciferol (VITAMIN D3) 1,000 unit tablet Take  by mouth daily.  nitroglycerin (NITROSTAT) 0.4 mg SL tablet by SubLINGual route every five (5) minutes as needed for Chest Pain.  topiramate (TOPAMAX) 25 mg tablet 3 tabs PO QHS    methylPREDNISolone (MEDROL DOSEPACK) 4 mg tablet Per dose pack instructions (Patient not taking: Reported on 7/6/2020)    montelukast (SINGULAIR) 10 mg tablet take 1 tablet by mouth at bedtime    COMPOUNDMAX BASE crea 240 g by Does Not Apply route four (4) times daily. Anti-Inflam Diclofenac Sodium 3%Gabapentin 3% Lidocaine 2% Prilocaine 2% (Patient not taking: Reported on 7/6/2020)    furosemide (LASIX) 20 mg tablet Take 20 mg by mouth daily as needed.  fluticasone (FLONASE) 50 mcg/actuation nasal spray 2 Sprays by Both Nostrils route daily. No current facility-administered medications for this visit.       Allergies   Allergen Reactions    Plavix [Clopidogrel] Rash     Social History Occupational History    Not on file   Tobacco Use    Smoking status: Former Smoker     Packs/day: 0.50     Years: 30.00     Pack years: 15.00     Last attempt to quit: 3/21/2009     Years since quittin.7    Smokeless tobacco: Never Used   Substance and Sexual Activity    Alcohol use: No    Drug use: No    Sexual activity: Not on file     Family History   Problem Relation Age of Onset    Hypertension Mother     Coronary Artery Disease Mother     Hypertension Father     Coronary Artery Disease Brother     Stroke Maternal Grandmother        THE  FOR Wallace Gutting  WAS REVIEWED BY Kimberly Cloud MD 12/15/2020 . DIAGNOSTIC IMAGING  LAB DATA      No results found for: HBA1C, HGBE8, WGC5YKBU, NQL7HDDF // No results found for: GLU, GLUCPOC     No results found for: NGB4GQDA, WCV8AMUR      No results found for: VITD3, XQVID2, XQVID3, XQVID, VD3RIA, [id number]      REVIEW OF SYSTEMS : 12/15/2020  ALL BELOW ARE Negative except : SEE HPI     CONSTITUTIONAL: No weight loss  PSYCHOLOGICAL : No Feelings of anxiety, depression, agitation  EYES: No blurred vision and no eye discharge. NO eye pain, double vision  ENT: No nasal discharge. No ear pain. CARDIOVASCULAR: No chest pain and no diaphoresis. RESPIRATORY: No cough, no hemoptysis. GI: No vomiting, no diarrhea   : No urinary frequency and no dysuria. MUSCULOSKELETAL: see HPI  SKIN: No rashes. NEURO:  No dizziness,weakness, headaches// No visual changes or confusion, or seizures,   ENDOCRINE: No polyphagia and no polydipsia. HEMATOLOGY: No bleeding tendencies. DIAGNOSTIC IMAGING       ANKLE X RAYS 3 VIEWS Right   X RAYS AT [facility]  12/15/2020    NON WEIGHT BEARING    X RAYS AT [address] [place] [address]  12/15/2020    Bones: No fractures or dislocations.  No focal osteolytic or osteoblastic process     Bone Spurs: No significant bone spurs  Alignment: Ankle mortise alignment is congruent, Tibial plafond and talar dome intact. No Osteochondral defects seen   Joint: No Significant OA changes present  Soft Tissues: Normal, No radiopaque foreign body     No abnormal calcific densities to soft tissues    No ankle joint effusion in lateral projection. Mineralization: Suggests  Osteopenia    Single AP foot, right foot, generalized osteopenia hallux valgus deformity, OA seen in right midfoot    I have personally reviewed the results of the above study. The interpretation of this study is my professional opinion         ANKLE X RAYS 3 VIEWS LEFT  X RAYS AT [address] [place] [address]  12/15/2020    NON WEIGHT BEARING    X RAYS AT [address] [place] [address]  12/15/2020    Bones: No fractures or dislocations. No focal osteolytic or osteoblastic process     Bone Spurs: No significant bone spurs  Alignment: Ankle mortise alignment is congruent, Tibial plafond and talar dome intact. No Osteochondral defects seen   Joint: No Significant OA changes present  Soft Tissues: Normal, No radiopaque foreign body     No abnormal calcific densities to soft tissues    No ankle joint effusion in lateral projection. Mineralization: Suggests no Osteopenia    I have personally reviewed the results of the above study.  The interpretation of this study is my professional opinion    Aleja Donovan MD  12/15/2020  1:08 PM

## 2020-12-31 ENCOUNTER — HOSPITAL ENCOUNTER (OUTPATIENT)
Dept: PHYSICAL THERAPY | Age: 80
Discharge: HOME | End: 2020-12-31
Payer: MEDICARE

## 2020-12-31 PROCEDURE — 97162 PT EVAL MOD COMPLEX 30 MIN: CPT

## 2020-12-31 PROCEDURE — 97535 SELF CARE MNGMENT TRAINING: CPT

## 2020-12-31 PROCEDURE — 97110 THERAPEUTIC EXERCISES: CPT

## 2020-12-31 NOTE — PROGRESS NOTES
[facility] at [address] [facility]  [address]  Phone: [phone]      Fax:  [phone]    Plan of Care/ Statement of Necessity for Physical Therapy Services  Patient name: Earle Marina Start of Care: 2020   Referral source: Megan Carmona MD : 1940    Medical Diagnosis: Right ankle pain [M25.571]  Left ankle pain [M25.572]  Payor: [place] MEDICARE / Plan: [place] MEDICARE PART A & B / Product Type: Medicare /  Onset Date: 2 weeks ago    Treatment Diagnosis: B ankle/foot pain (right>left)   Prior Hospitalization: see medical history Provider#: [id number]   Medications: Verified on Patient summary List    Comorbidities: hx L3/4 lumbar fusion, pacemaker, HTN   Prior Level of Function: Independent with ADLs and functional tasks with no B ankle or foot pain. The Plan of Care and following information is based on the information from the initial evaluation. Assessment/ key information:   Pt is a [de-identified]year old female who presents to therapy today with right>left ankle/foot pain. Pt states that her symptoms began about 2 weeks ago when she woke up with pain when she started walking. Pt reports having pain with walking and weight bearing. She reports having swelling in the B achilles region. Pt demonstrated decreased AROM, decreased strength, tenderness to palpation, muscle tightness and impaired gait. Pt would benefit from physical therapy to improve the above impairments to help the pt return to performing ADLs and functional activities. Evaluation Complexity History MEDIUM  Complexity : 1-2 comorbidities / personal factors will impact the outcome/ POC ; Examination HIGH Complexity : 4+ Standardized tests and measures addressing body structure, function, activity limitation and / or participation in recreation  ;Presentation MEDIUM Complexity : Evolving with changing characteristics  ; Clinical Decision Making MEDIUM Complexity : FOTO score of 26-74  Overall Complexity Rating: MEDIUM  Problem List: pain affecting function, decrease ROM, decrease strength, edema affecting function, impaired gait/ balance, decrease ADL/ functional abilitiies, decrease activity tolerance, decrease flexibility/ joint mobility and decrease transfer abilities   Treatment Plan may include any combination of the following: Therapeutic exercise, Therapeutic activities, Neuromuscular re-education, Physical agent/modality, Gait/balance training, Manual therapy, Patient education, Self Care training, Functional mobility training, Home safety training and Stair training  Patient / Family readiness to learn indicated by: asking questions, trying to perform skills and interest  Persons(s) to be included in education: patient (P)  Barriers to Learning/Limitations: None  Patient Goal (s): have less pain  Patient Self Reported Health Status: fair  Rehabilitation Potential: fair    Short Term Goals: To be accomplished in 2 treatments:  1. Pt will report compliance and independence to HEP to help the pt manage their pain and symptoms. Eval: established   Long Term Goals: To be accomplished in 10 treatments:  1. Pt will report 50% improvement in symptoms to improve ease of gait. Eval: 0% improvement  2. Pt will increase MMT B ankle DF to 4-/5, right ankle PF to 4/5, B ankle INV to 4/5 to improve ability to tolerate ADLs. Eval: B ankle DF 3+/5, right ankle PF 4-/5, B ankle INV 4-/5; all with increased pain. 3. Pt will increase AROM B ankle DF to at least 5 degs, right ankle PF to 70 degs, B ankle EV to 15 degs to improve ability to negotiate stairs with less pain. Eval: right ankle DF 0 degs, left ankle DF 1 degs, right ankle PF 62 degs, right ankle EV 8 degs, left ankle EV 10 degs; all with increased pain. 4. Pt will report being able to walk for 10 mins with mild to no increased B ankle/achilles pain to improve tolerance to community ambulation.    Eval: elevated pain with gait in B ankles/achilles     Frequency / Duration: Patient to be seen 2 times per week for 10 treatments. Patient/ Caregiver education and instruction: Diagnosis, prognosis, self care, activity modification and exercises   [x]  Plan of care has been reviewed with PTA    Certification Period: 12/31/2020 -1/29/2021    Concepción Figueroa, PT 12/31/2020 3:01 PM  _____________________________________________________________________  I certify that the above Therapy Services are being furnished while the patient is under my care. I agree with the treatment plan and certify that this therapy is necessary.     Physician's Signature:____________________  Date:__________Time:______    Please sign and return to [facility] at [address] [place] [address] [address]  Phone: [phone]      Fax:  [phone]

## 2020-12-31 NOTE — PROGRESS NOTES
PT DAILY TREATMENT NOTE  10-18    Patient Name: Tracy Silverio  Date:2020  : 1940  [x]  Patient  Verified  Payor: VA MEDICARE / Plan: VA MEDICARE PART A & B / Product Type: Medicare /    In time:2:20  Out time:2:59  Total Treatment Time (min): 39  Visit #: 1 of 10    Medicare/BCBS Only   Total Timed Codes (min):  24 1:1 Treatment Time:  39     Treatment Area: Right ankle pain [M25.571]  Left ankle pain [M25.572]    SUBJECTIVE  Pain Level (0-10 scale): 4-5  Any medication changes, allergies to medications, adverse drug reactions, diagnosis change, or new procedure performed?: [x] No    [] Yes (see summary sheet for update)  Subjective functional status/changes:   [] No changes reported  See POC    OBJECTIVE    15 min [x]Eval                  []Re-Eval     12 min Therapeutic Exercise:  [] See flow sheet : HEP instruction and demonstration   Rationale: increase ROM and increase strength to improve the patients ability to tolerate ADLs    12 min Self Care/Home Management: []  See flow sheet : pt education regarding anatomy and physiology of the LEs/achilles/back and how it relates to the pt's condition, pt education on using ice on the achilles and heat on the calves for 15-20 mins as needed to decrease pain/symptoms, pt education regarding wearing shoes with a heel drop around the home and community along with avoiding walking barefoot to avoid increased pain. Rationale: increase ROM, increase strength and decrease pain/symptoms  to improve the patients ability to tolerate functional tasks. With   [x] TE   [] TA   [] neuro   [x] Other: Self Care/Home management Patient Education: [x] Review HEP    [] Progressed/Changed HEP based on:   [] positioning   [] body mechanics   [] transfers   [] heat/ice application    [] other:      Other Objective/Functional Measures: See evaluation.      Pain Level (0-10 scale) post treatment: 4-5    ASSESSMENT/Changes in Function: Pt given HEP handout to perform. Pt understood exercises in HEP handout. Pt demonstrated decreased AROM, decreased strength, tenderness to palpation, muscle tightness and impaired gait. Pt would benefit from physical therapy to improve the above impairments to help the pt return to performing ADLs and functional activities. Patient will continue to benefit from skilled PT services to modify and progress therapeutic interventions, address functional mobility deficits, address ROM deficits, address strength deficits, analyze and address soft tissue restrictions, analyze and cue movement patterns, analyze and modify body mechanics/ergonomics, address imbalance/dizziness and instruct in home and community integration to attain remaining goals. [x]  See Plan of Care  []  See progress note/recertification  []  See Discharge Summary         Progress towards goals / Updated goals:  Short Term Goals: To be accomplished in 2 treatments:  1. Pt will report compliance and independence to [facility] to help the pt manage their pain and symptoms  Eval: established   Long Term Goals: To be accomplished in 10 treatments:  1. Pt will report 50% improvement in symptoms to improve ease of gait. Eval: 0% improvement  2. Pt will increase MMT B ankle DF to 4-/5, right ankle PF to 4/5, B ankle INV to 4/5 to improve ability to tolerate ADLs. Eval: B ankle DF 3+/5, right ankle PF 4-/5, B ankle INV 4-/5; all with increased pain. 3. Pt will increase AROM B ankle DF to at least 5 degs, right ankle PF to 70 degs, B ankle EV to 15 degs to improve ability to negotiate stairs with less pain. Eval: right ankle DF 0 degs, left ankle DF 1 degs, right ankle PF 62 degs, right ankle EV 8 degs, left ankle EV 10 degs; all with increased pain. 4. Pt will report being able to walk for 10 mins with mild to no increased B ankle/achilles pain to improve tolerance to community ambulation.    Eval: elevated pain with gait in B ankles/achilles     PLAN  [x]  Upgrade activities as tolerated     [x]  Continue plan of care  [x]  Update interventions per flow sheet       []  Discharge due to:_  []  Other:_      Jin keenan, PT 12/31/2020  3:01 PM    Future Appointments   Date Time Provider Laith Graham   2/2/2021 10:15 AM Yanick Russo MD [facility]

## 2021-01-04 ENCOUNTER — APPOINTMENT (OUTPATIENT)
Dept: PHYSICAL THERAPY | Age: 81
End: 2021-01-04

## 2021-01-22 ENCOUNTER — APPOINTMENT (OUTPATIENT)
Dept: PHYSICAL THERAPY | Age: 81
End: 2021-01-22

## 2021-01-24 RX ORDER — DULOXETIN HYDROCHLORIDE 60 MG/1
CAPSULE, DELAYED RELEASE ORAL
Qty: 90 CAP | Refills: 1 | Status: SHIPPED | OUTPATIENT
Start: 2021-01-24 | End: 2021-08-19 | Stop reason: SDUPTHER

## 2021-01-25 NOTE — PROGRESS NOTES
[facility] at [address]  [address]  Phone: [phone]      Fax:  [phone]    Discharge Summary    Patient name: Kg Horowitz Start of Care: 2020   Referral source: Araceli Bhat MD : 1940               Medical Diagnosis: Right ankle pain [M25.571]  Left ankle pain [M25.572]  Payor: VA MEDICARE / Plan: VA MEDICARE PART A & B / Product Type: Medicare /  Onset Date: 2 weeks ago               Treatment Diagnosis: B ankle/foot pain (right>left)   Prior Hospitalization: see medical history Provider#: [id number]   Medications: Verified on Patient summary List    Comorbidities: hx L3/4 lumbar fusion, pacemaker, HTN   Prior Level of Function: Independent with ADLs and functional tasks with no B ankle or foot pain. Visits from Start of Care: 1    Missed Visits: 3    Reporting Period :  20    No goals achieved due to discontinuing care following initial evaluation. Assessment/ Summary of Care: Pt self-deferred from continuing skilled physical therapy plan of care, missing 3+ sessions without contact leading to her discharge.     RECOMMENDATIONS:  [x]Discontinue therapy: []Patient has reached or is progressing toward set goals      [x]Patient is non-compliant or has abdicated      []Due to lack of appreciable progress towards set [facility] TAPIA, PTA 2021 10:54 AM

## 2021-01-26 ENCOUNTER — APPOINTMENT (OUTPATIENT)
Dept: PHYSICAL THERAPY | Age: 81
End: 2021-01-26

## 2021-01-28 ENCOUNTER — APPOINTMENT (OUTPATIENT)
Dept: PHYSICAL THERAPY | Age: 81
End: 2021-01-28

## 2021-02-01 ENCOUNTER — APPOINTMENT (OUTPATIENT)
Dept: PHYSICAL THERAPY | Age: 81
End: 2021-02-01

## 2021-02-02 DIAGNOSIS — M25.571 ACUTE BILATERAL ANKLE PAIN: ICD-10-CM

## 2021-02-02 DIAGNOSIS — M51.36 DDD (DEGENERATIVE DISC DISEASE), LUMBAR: ICD-10-CM

## 2021-02-02 DIAGNOSIS — M48.062 SPINAL STENOSIS OF LUMBAR REGION WITH NEUROGENIC CLAUDICATION: ICD-10-CM

## 2021-02-02 DIAGNOSIS — M25.572 ACUTE BILATERAL ANKLE PAIN: ICD-10-CM

## 2021-02-02 DIAGNOSIS — G03.9 ARACHNOIDITIS: ICD-10-CM

## 2021-02-02 DIAGNOSIS — M96.1 LUMBAR POST-LAMINECTOMY SYNDROME: ICD-10-CM

## 2021-02-02 DIAGNOSIS — M54.16 LUMBAR NEURITIS: ICD-10-CM

## 2021-02-02 RX ORDER — TOPIRAMATE 25 MG/1
TABLET ORAL
Qty: 90 TAB | Refills: 1 | OUTPATIENT
Start: 2021-02-02

## 2021-02-02 NOTE — TELEPHONE ENCOUNTER
Last Visit: 12/11/20 with MD Yoana Caba  Next Appointment: none  Previous Refill Encounter(s): 12/11/20 #90 with 1 refill    Requested Prescriptions     Pending Prescriptions Disp Refills    topiramate (TOPAMAX) 25 mg tablet 90 Tab 1     Sig: 3 tabs PO QHS

## 2021-02-03 ENCOUNTER — APPOINTMENT (OUTPATIENT)
Dept: PHYSICAL THERAPY | Age: 81
End: 2021-02-03

## 2021-02-08 ENCOUNTER — APPOINTMENT (OUTPATIENT)
Dept: PHYSICAL THERAPY | Age: 81
End: 2021-02-08

## 2021-02-08 RX ORDER — TOPIRAMATE 25 MG/1
TABLET ORAL
Qty: 90 TAB | Refills: 0 | Status: SHIPPED | OUTPATIENT
Start: 2021-02-08 | End: 2021-02-17 | Stop reason: SDUPTHER

## 2021-02-08 NOTE — TELEPHONE ENCOUNTER
Pharmacy has sent another request for this. Patient was only given a 60 d/s on 12/11/20.  Please clarify reason for refusal.

## 2021-02-08 NOTE — TELEPHONE ENCOUNTER
I gave a month supply and sent it to her pharmacy. She was suppose to f/u in Feb but cancelled her appt.   She needs to reschedule her appt for further refills

## 2021-02-10 ENCOUNTER — APPOINTMENT (OUTPATIENT)
Dept: PHYSICAL THERAPY | Age: 81
End: 2021-02-10

## 2021-02-17 ENCOUNTER — OFFICE VISIT (OUTPATIENT)
Dept: ORTHOPEDIC SURGERY | Age: 81
End: 2021-02-17
Payer: MEDICARE

## 2021-02-17 VITALS
OXYGEN SATURATION: 97 % | RESPIRATION RATE: 17 BRPM | DIASTOLIC BLOOD PRESSURE: 70 MMHG | HEART RATE: 71 BPM | HEIGHT: 61 IN | BODY MASS INDEX: 28.32 KG/M2 | WEIGHT: 150 LBS | TEMPERATURE: 97.3 F | SYSTOLIC BLOOD PRESSURE: 131 MMHG

## 2021-02-17 DIAGNOSIS — M51.36 DDD (DEGENERATIVE DISC DISEASE), LUMBAR: ICD-10-CM

## 2021-02-17 DIAGNOSIS — M96.1 LUMBAR POST-LAMINECTOMY SYNDROME: ICD-10-CM

## 2021-02-17 DIAGNOSIS — M48.062 SPINAL STENOSIS OF LUMBAR REGION WITH NEUROGENIC CLAUDICATION: ICD-10-CM

## 2021-02-17 DIAGNOSIS — G03.9 ARACHNOIDITIS: ICD-10-CM

## 2021-02-17 DIAGNOSIS — M54.16 LUMBAR NEURITIS: ICD-10-CM

## 2021-02-17 PROCEDURE — G8432 DEP SCR NOT DOC, RNG: HCPCS | Performed by: NURSE PRACTITIONER

## 2021-02-17 PROCEDURE — G8427 DOCREV CUR MEDS BY ELIG CLIN: HCPCS | Performed by: NURSE PRACTITIONER

## 2021-02-17 PROCEDURE — G8400 PT W/DXA NO RESULTS DOC: HCPCS | Performed by: NURSE PRACTITIONER

## 2021-02-17 PROCEDURE — 99213 OFFICE O/P EST LOW 20 MIN: CPT | Performed by: NURSE PRACTITIONER

## 2021-02-17 PROCEDURE — G8419 CALC BMI OUT NRM PARAM NOF/U: HCPCS | Performed by: NURSE PRACTITIONER

## 2021-02-17 PROCEDURE — 1090F PRES/ABSN URINE INCON ASSESS: CPT | Performed by: NURSE PRACTITIONER

## 2021-02-17 PROCEDURE — 1101F PT FALLS ASSESS-DOCD LE1/YR: CPT | Performed by: NURSE PRACTITIONER

## 2021-02-17 PROCEDURE — G8536 NO DOC ELDER MAL SCRN: HCPCS | Performed by: NURSE PRACTITIONER

## 2021-02-17 RX ORDER — TOPIRAMATE 25 MG/1
TABLET ORAL
Qty: 90 TAB | Refills: 5 | Status: SHIPPED | OUTPATIENT
Start: 2021-02-17 | End: 2022-09-15 | Stop reason: SDUPTHER

## 2021-02-17 NOTE — PROGRESS NOTES
Chief complaint   Chief Complaint   Patient presents with    Back Pain       History of Present Illness:  Pillo Hernandez is a  [de-identified] y.o.  female comes in today for follow-up of her chronic low back pain with bilateral lower extremity pain down to her ankles. He states the increase in Topamax from 50 to 75 mg at bedtime did not make much difference however she rates her pain today as a 0 out of 10. She also takes Cymbalta 60 mg daily. She states 2 weeks ago she had a flare of pain without injury and she had some leftover prednisone so she took that and Tylenol and it calmed down. She states she will get these flares from time to time. She has some dizziness today and a little bit of frontal headache and feels like her sinuses are acting up. She states she is better today than she was yesterday with it but has not been to her PCP yet. Denies fever bowel bladder dysfunction. She denies any new medical issues. Physical Exam: The patient is a 72-year-old female well-developed well-nourished who is alert and oriented with a normal mood and affect. She has a full weightbearing nonantalgic gait. She did not use any assistive device. She has 4 out of 5 strength bilateral lower extremities. Negative straight leg raise. She has pain with hyperextension lumbar spine. 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 She has had prior surgery in addition and has lumbar postlaminectomy syndrome. I have refilled her Topamax. She will continue her Cymbalta. She is going to go see her PCP regarding her sinuses. We will see her back in 6 months sooner if needed.         Review of systems:    Past Medical History:   Diagnosis Date    Arrhythmia     Cardiac disease     Hypercholesteremia     Hyperlipidemia     Hypertension     Pacemaker      Past Surgical History:   Procedure Laterality Date    [place] BREAST SURGERY PROCEDURE UNLISTED Bilateral     bilateral breast reducution post procedure orderset 688 cyst right breast removed    [place] CARDIAC SURG PROCEDURE UNLIST      coronary artery bypass graft, coronary stent placement     Social History     Socioeconomic History    Marital status:      Spouse name: Not on file    Number of children: Not on file    Years of education: Not on file    Highest education level: Not on file   Occupational History    Not on file   Social Needs    Financial resource strain: Not on file    Food insecurity     Worry: Not on file     Inability: Not on file    Transportation needs     Medical: Not on file     Non-medical: Not on file   Tobacco Use    Smoking status: Former Smoker     Packs/day: 0.50     Years: 30.00     Pack years: 15.00     Quit date: 3/21/2009     Years since quittin.9    Smokeless tobacco: Never Used   Substance and Sexual Activity    Alcohol use: No    Drug use: No    Sexual activity: Not on file   Lifestyle    Physical activity     Days per week: Not on file     Minutes per session: Not on file    Stress: Not on file   Relationships    Social connections     Talks on phone: Not on file     Gets together: Not on file     Attends [employer] service: Not on file     Active member of club or organization: Not on file     Attends meetings of clubs or organizations: Not on file     Relationship status: Not on file    Intimate partner violence     Fear of current or ex partner: Not on file     Emotionally abused: Not on file     Physically abused: Not on file     Forced sexual activity: Not on file   Other Topics Concern    Not on file   Social History Narrative    Not on file     Family History   Problem Relation Age of Onset    Hypertension Mother     Coronary Artery Disease Mother     Hypertension Father     Coronary Artery Disease Brother     Stroke Maternal Grandmother        Physical Exam:  Visit Vitals  /70 (BP 1 Location: Left upper arm)   Pulse 71   Temp 97.3 °F (36.3 °C)   Resp 17   Ht 5' 0.5\" (1.537 m)   Wt 150 lb (68 kg)   SpO2 97%   BMI 28.81 kg/m²     Pain Scale: 0 - No pain/10       has been . reviewed and is appropriate          Diagnoses and all orders for this visit:    1. Spinal stenosis of lumbar region with neurogenic claudication  -     topiramate (TOPAMAX) 25 mg tablet; 3 tabs PO QHS    2. Lumbar post-laminectomy syndrome  -     topiramate (TOPAMAX) 25 mg tablet; 3 tabs PO QHS    3. Lumbar neuritis  -     topiramate (TOPAMAX) 25 mg tablet; 3 tabs PO QHS    4. DDD (degenerative disc disease), lumbar  -     topiramate (TOPAMAX) 25 mg tablet; 3 tabs PO QHS    5. Arachnoiditis  -     topiramate (TOPAMAX) 25 mg tablet; 3 tabs PO QHS            Follow-up and Dispositions    · Return in about 6 months (around 8/17/2021) for with NP Lupe.              We have informed Lexi Paradise to notify us for immediate appointment if she has any worsening neurogical symptoms or if an emergency situation presents, then call [phone]

## 2021-08-19 ENCOUNTER — VIRTUAL VISIT (OUTPATIENT)
Dept: ORTHOPEDIC SURGERY | Age: 81
End: 2021-08-19
Payer: MEDICARE

## 2021-08-19 DIAGNOSIS — M51.36 DDD (DEGENERATIVE DISC DISEASE), LUMBAR: ICD-10-CM

## 2021-08-19 DIAGNOSIS — M54.16 LUMBAR NEURITIS: ICD-10-CM

## 2021-08-19 DIAGNOSIS — M48.062 SPINAL STENOSIS OF LUMBAR REGION WITH NEUROGENIC CLAUDICATION: ICD-10-CM

## 2021-08-19 DIAGNOSIS — G03.9 ARACHNOIDITIS: ICD-10-CM

## 2021-08-19 DIAGNOSIS — M96.1 LUMBAR POST-LAMINECTOMY SYNDROME: ICD-10-CM

## 2021-08-19 PROCEDURE — 99443 PR PHYS/QHP TELEPHONE EVALUATION 21-30 MIN: CPT | Performed by: NURSE PRACTITIONER

## 2021-08-19 RX ORDER — DULOXETIN HYDROCHLORIDE 60 MG/1
60 CAPSULE, DELAYED RELEASE ORAL DAILY
Qty: 90 CAPSULE | Refills: 1 | Status: SHIPPED | OUTPATIENT
Start: 2021-08-19 | End: 2022-02-24 | Stop reason: SDUPTHER

## 2021-08-19 RX ORDER — TOPIRAMATE 50 MG/1
50 TABLET, FILM COATED ORAL
Qty: 90 TABLET | Refills: 1 | Status: SHIPPED | OUTPATIENT
Start: 2021-08-19 | End: 2022-02-24 | Stop reason: SDUPTHER

## 2021-08-19 NOTE — PROGRESS NOTES
History of Present Illness:    Consent: Maye Root, who was seen by telephone call and/or her healthcare decision maker, is aware that this patient-initiated, Telehealth encounter on 8/19/2021 is a billable service, with coverage as determined by her insurance carrier. She is aware that she may receive a bill and has provided verbal consent to proceed: Yes. The provider was located at Home office and the patient was located at their home. No one else participated in the visit with the patient. The platform used was telephone call. The patient was evaluated today for follow-up of her chronic low back pain with bilateral lower extremity pain down to her ankles. He is taking Topamax from 50 mg at bedtime which is working well for her . She rates her pain today as a 0 out of 10. She also takes Cymbalta 60 mg daily. She does not report any flares of pain. Denies fever bowel bladder dysfunction. She denies any new medical issues.        Physical Exam: The patient is a 79-year-old female well-developed well-nourished who is alert and oriented with a normal mood and affect. She spoke with fluency and does not appear to be in any distress.       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 She has had prior surgery and has lumbar postlaminectomy syndrome. I have refilled her Topamax and  Cymbalta. We will see her back in 6 months sooner if needed.                   Diagnoses and all orders for this visit:    1. Spinal stenosis of lumbar region with neurogenic claudication  -     DULoxetine (CYMBALTA) 60 mg capsule; Take 1 Capsule by mouth daily. -     topiramate (TOPAMAX) 50 mg tablet; Take 1 Tablet by mouth nightly. 2. Lumbar post-laminectomy syndrome  -     DULoxetine (CYMBALTA) 60 mg capsule; Take 1 Capsule by mouth daily. -     topiramate (TOPAMAX) 50 mg tablet;  Take 1 Tablet by mouth nightly. 3. Lumbar neuritis  -     DULoxetine (CYMBALTA) 60 mg capsule; Take 1 Capsule by mouth daily. -     topiramate (TOPAMAX) 50 mg tablet; Take 1 Tablet by mouth nightly. 4. DDD (degenerative disc disease), lumbar  -     DULoxetine (CYMBALTA) 60 mg capsule; Take 1 Capsule by mouth daily. -     topiramate (TOPAMAX) 50 mg tablet; Take 1 Tablet by mouth nightly. 5. Arachnoiditis  -     DULoxetine (CYMBALTA) 60 mg capsule; Take 1 Capsule by mouth daily. -     topiramate (TOPAMAX) 50 mg tablet; Take 1 Tablet by mouth nightly. We discussed the expected course, resolution and complications of the diagnosis(es) in detail. Medication risks, benefits, costs, interactions, and alternatives were discussed as indicated. I advised her to contact the office if her condition worsens, changes or fails to improve as anticipated. For emergencies or worsening neurological symptoms the patient was instructed to call 911. She expressed understanding with the diagnosis(es) and plan. Follow-up and Dispositions    · Return in about 6 months (around 2/19/2022) for with JIMI Andrade. 1    Divya Goff is a [de-identified] y.o. female being evaluated by a video visit encounter for concerns as above. A caregiver was present when appropriate. Due to this being a TeleHealth encounter (During [facility] public health emergency), evaluation of the following organ systems was limited: Vitals/Constitutional/EENT/Resp/CV/GI//MS/Neuro/Skin/Heme-Lymph-Imm. Pursuant to the emergency declaration under the [facility], [facility] waiver authority and the [employer] Act, this Virtual  Visit was conducted, with patient's (and/or legal guardian's) consent, to reduce the patient's risk of exposure to COVID-19 and provide necessary medical care.              CPT Codes 44184-16389 for Established Patients may apply to this Telehealth Visit  Time-based coding, delete if not needed: I spent at least 15 minutes with this established patient, and >50% of the time was spent counseling and/or coordinating care regarding back and leg pain  Start time:  8:45 am and Stop time: 9:08 am        Due to this being a TeleHealth evaluation, many elements of the physical examination are unable to be assessed. Pursuant to the emergency declaration under the [address] [place] [address] waiver authority and the [employer] Act, this Virtual  Visit was conducted, with patient's consent, to reduce the patient's risk of exposure to COVID-19 and provide continuity of care for an established patient. Services were provided through a video synchronous discussion virtually to substitute for in-person clinic visit.     Mirela Scherer NP

## 2022-02-24 ENCOUNTER — OFFICE VISIT (OUTPATIENT)
Dept: ORTHOPEDIC SURGERY | Age: 82
End: 2022-02-24
Payer: MEDICARE

## 2022-02-24 VITALS
HEIGHT: 61 IN | HEART RATE: 93 BPM | TEMPERATURE: 97.4 F | OXYGEN SATURATION: 99 % | BODY MASS INDEX: 29.15 KG/M2 | WEIGHT: 154.4 LBS

## 2022-02-24 DIAGNOSIS — M96.1 LUMBAR POST-LAMINECTOMY SYNDROME: ICD-10-CM

## 2022-02-24 DIAGNOSIS — M54.16 LUMBAR NEURITIS: ICD-10-CM

## 2022-02-24 DIAGNOSIS — M51.36 DDD (DEGENERATIVE DISC DISEASE), LUMBAR: ICD-10-CM

## 2022-02-24 DIAGNOSIS — M48.062 SPINAL STENOSIS OF LUMBAR REGION WITH NEUROGENIC CLAUDICATION: ICD-10-CM

## 2022-02-24 DIAGNOSIS — G03.9 ARACHNOIDITIS: ICD-10-CM

## 2022-02-24 PROCEDURE — G8427 DOCREV CUR MEDS BY ELIG CLIN: HCPCS | Performed by: NURSE PRACTITIONER

## 2022-02-24 PROCEDURE — 1090F PRES/ABSN URINE INCON ASSESS: CPT | Performed by: NURSE PRACTITIONER

## 2022-02-24 PROCEDURE — 99213 OFFICE O/P EST LOW 20 MIN: CPT | Performed by: NURSE PRACTITIONER

## 2022-02-24 PROCEDURE — G8419 CALC BMI OUT NRM PARAM NOF/U: HCPCS | Performed by: NURSE PRACTITIONER

## 2022-02-24 PROCEDURE — 1101F PT FALLS ASSESS-DOCD LE1/YR: CPT | Performed by: NURSE PRACTITIONER

## 2022-02-24 PROCEDURE — G8432 DEP SCR NOT DOC, RNG: HCPCS | Performed by: NURSE PRACTITIONER

## 2022-02-24 PROCEDURE — G8536 NO DOC ELDER MAL SCRN: HCPCS | Performed by: NURSE PRACTITIONER

## 2022-02-24 PROCEDURE — G8400 PT W/DXA NO RESULTS DOC: HCPCS | Performed by: NURSE PRACTITIONER

## 2022-02-24 RX ORDER — DULOXETIN HYDROCHLORIDE 60 MG/1
60 CAPSULE, DELAYED RELEASE ORAL DAILY
Qty: 90 CAPSULE | Refills: 1 | Status: SHIPPED | OUTPATIENT
Start: 2022-02-24 | End: 2022-08-15

## 2022-02-24 RX ORDER — TOPIRAMATE 50 MG/1
50 TABLET, FILM COATED ORAL
Qty: 90 TABLET | Refills: 1 | Status: SHIPPED | OUTPATIENT
Start: 2022-02-24 | End: 2022-08-15

## 2022-02-24 NOTE — PROGRESS NOTES
Chief complaint   Chief Complaint   Patient presents with    Back Pain     lower follow up meds       History of Present Illness:  Sanjay Bhatti is a  80 y.o.  female who comes in today for follow-up of her chronic low back pain with bilateral lower extremity pain down to her ankles. She continues to do well on Topamax 50 mg at bedtime and Cymbalta 60 mg daily. She states this combination medication keeps her pain mostly at 0. She does get a flare of pain from time to time but she is able to control that with Tylenol extra strength. She is retired. She is a non-smoker. She denies fever bowel bladder dysfunction. Physical Exam: The patient is a 22-year-old female well-developed well-nourished who is alert and oriented with a normal mood and affect. She is a full weightbearing nonantalgic gait. She did not use any assist device. She has 4-5 strength bilateral lower extremities. Negative straight leg raise. 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  She has had prior surgery and has lumbar postlaminectomy syndrome.  I have refilled her Topamax and  Cymbalta.  We will see her back in 6 months sooner if needed now      Medications:  Current Outpatient Medications   Medication Sig Dispense Refill    DULoxetine (CYMBALTA) 60 mg capsule Take 1 Capsule by mouth daily. 90 Capsule 1    topiramate (TOPAMAX) 50 mg tablet Take 1 Tablet by mouth nightly. 90 Tablet 1    topiramate (TOPAMAX) 25 mg tablet 3 tabs PO QHS 90 Tab 5    levothyroxine (SYNTHROID) 25 mcg tablet Take 25 mcg by mouth.  DULoxetine (CYMBALTA) 60 mg capsule Take 1 Cap by mouth daily. 90 Cap 1    naproxen (NAPROSYN) 500 mg tablet take 1 tablet by mouth twice a day if needed for pain  0    amiodarone (CORDARONE) 200 mg tablet Take 200 mg by mouth daily.   0    amLODIPine (NORVASC) 10 mg tablet Take 10 mg by mouth daily.  1    losartan (COZAAR) 100 mg tablet Take 100 mg by mouth daily. 0    metoprolol succinate (TOPROL-XL) 25 mg XL tablet Take 25 mg by mouth daily. 0    potassium chloride (K-DUR, KLOR-CON) 20 mEq tablet Take 20 mEq by mouth daily as needed. 1    pravastatin (PRAVACHOL) 40 mg tablet Take 40 mg by mouth daily. 1    RANEXA 500 mg SR tablet Take 500 mg by mouth daily.  triamterene-hydrochlorothiazide (DYAZIDE) 37.5-25 mg per capsule Take 1 Cap by mouth every morning. 1    aspirin delayed-release 81 mg tablet Take  by mouth daily.  cholecalciferol (VITAMIN D3) 1,000 unit tablet Take  by mouth daily.  nitroglycerin (NITROSTAT) 0.4 mg SL tablet by SubLINGual route every five (5) minutes as needed for Chest Pain.  furosemide (LASIX) 20 mg tablet Take 20 mg by mouth daily as needed. (Patient not taking: Reported on 2022)  0    fluticasone (FLONASE) 50 mcg/actuation nasal spray 2 Sprays by Both Nostrils route daily.  (Patient not taking: Reported on 2022)             Review of systems:    Past Medical History:   Diagnosis Date    Arrhythmia     Cardiac disease     Hypercholesteremia     Hyperlipidemia     Hypertension     Lower back pain     Pacemaker      Past Surgical History:   Procedure Laterality Date    [place] BREAST SURGERY PROCEDURE UNLISTED Bilateral     bilateral breast reducution post procedure orderset 688 cyst right breast removed    [place] CARDIAC SURG PROCEDURE UNLIST      coronary artery bypass graft, coronary stent placement     Social History     Socioeconomic History    Marital status:      Spouse name: Not on file    Number of children: Not on file    Years of education: Not on file    Highest education level: Not on file   Occupational History    Not on file   Tobacco Use    Smoking status: Former Smoker     Packs/day: 0.50     Years: 30.00     Pack years: 15.00     Quit date: 3/21/2009     Years since quittin.9    Smokeless tobacco: Never Used   Substance and Sexual Activity    Alcohol use: No    Drug use: No    Sexual activity: Not on file   Other Topics Concern    Not on file   Social History Narrative    Not on file     Social Determinants of Health     Financial Resource Strain:     Difficulty of Paying Living Expenses: Not on file   Food Insecurity:     Worried About Running Out of Food in the Last Year: Not on file    [place] of Food in the Last Year: Not on file   Transportation Needs:     Lack of Transportation (Medical): Not on file    Lack of Transportation (Non-Medical): Not on file   Physical Activity:     Days of Exercise per Week: Not on file    Minutes of Exercise per Session: Not on file   Stress:     Feeling of Stress : Not on file   Social Connections:     Frequency of Communication with Friends and Family: Not on file    Frequency of Social Gatherings with Friends and Family: Not on file    Attends [employer] Services: Not on file    Active Member of [address] [place] [address] or Organizations: Not on file    Attends Club or Organization Meetings: Not on file    Marital Status: Not on file   Intimate Partner Violence:     Fear of Current or Ex-Partner: Not on file    Emotionally Abused: Not on file    Physically Abused: Not on file    Sexually Abused: Not on file   Housing Stability:     Unable to Pay for Housing in the Last Year: Not on file    Number of [place] in the Last Year: Not on file    Unstable Housing in the Last Year: Not on file     Family History   Problem Relation Age of Onset    Hypertension Mother     Coronary Art Dis Mother     Hypertension Father     Coronary Art Dis Brother     Stroke Maternal Grandmother        Physical Exam:  Visit Vitals  Pulse 93   Temp 97.4 °F (36.3 °C)   Ht 5' 0.5\" (1.537 m)   Wt 154 lb 6.4 oz (70 kg)   SpO2 99%   BMI 29.66 kg/m²     Pain Scale: 0 - No pain/10       has been . reviewed and is appropriate          Diagnoses and all orders for this visit:    1.  Spinal stenosis of lumbar region with neurogenic claudication  -     DULoxetine (CYMBALTA) 60 mg capsule; Take 1 Capsule by mouth daily. -     topiramate (TOPAMAX) 50 mg tablet; Take 1 Tablet by mouth nightly. 2. Lumbar post-laminectomy syndrome  -     DULoxetine (CYMBALTA) 60 mg capsule; Take 1 Capsule by mouth daily. -     topiramate (TOPAMAX) 50 mg tablet; Take 1 Tablet by mouth nightly. 3. Lumbar neuritis  -     DULoxetine (CYMBALTA) 60 mg capsule; Take 1 Capsule by mouth daily. -     topiramate (TOPAMAX) 50 mg tablet; Take 1 Tablet by mouth nightly. 4. DDD (degenerative disc disease), lumbar  -     DULoxetine (CYMBALTA) 60 mg capsule; Take 1 Capsule by mouth daily. -     topiramate (TOPAMAX) 50 mg tablet; Take 1 Tablet by mouth nightly. 5. Arachnoiditis  -     DULoxetine (CYMBALTA) 60 mg capsule; Take 1 Capsule by mouth daily. -     topiramate (TOPAMAX) 50 mg tablet; Take 1 Tablet by mouth nightly. Follow-up and Dispositions    · Return in about 6 months (around 8/24/2022) for with JIMI Andrade.              We have informed Amadeo Gaitanmins to notify us for immediate appointment if she has any worsening neurogical symptoms or if an emergency situation presents, then call [phone]

## 2022-03-18 PROBLEM — M54.42 LOW BACK PAIN WITH LEFT-SIDED SCIATICA: Status: ACTIVE | Noted: 2017-04-03

## 2022-03-18 PROBLEM — M96.1 LUMBAR POST-LAMINECTOMY SYNDROME: Status: ACTIVE | Noted: 2017-04-03

## 2022-03-19 PROBLEM — M51.36 DDD (DEGENERATIVE DISC DISEASE), LUMBAR: Status: ACTIVE | Noted: 2017-04-03

## 2022-03-19 PROBLEM — M48.061 LUMBAR SPINAL STENOSIS: Status: ACTIVE | Noted: 2017-04-03

## 2022-03-19 PROBLEM — M54.16 LUMBAR NEURITIS: Status: ACTIVE | Noted: 2017-04-03

## 2022-03-19 PROBLEM — G03.9 ARACHNOIDITIS: Status: ACTIVE | Noted: 2017-05-16

## 2022-03-19 PROBLEM — M51.36 OTHER INTERVERTEBRAL DISC DEGENERATION, LUMBAR REGION: Status: ACTIVE | Noted: 2017-05-01

## 2022-03-19 PROBLEM — M51.369 OTHER INTERVERTEBRAL DISC DEGENERATION, LUMBAR REGION: Status: ACTIVE | Noted: 2017-05-01

## 2022-03-19 PROBLEM — M51.369 DDD (DEGENERATIVE DISC DISEASE), LUMBAR: Status: ACTIVE | Noted: 2017-04-03

## 2022-03-20 PROBLEM — M54.16 RADICULOPATHY, LUMBAR REGION: Status: ACTIVE | Noted: 2017-05-01

## 2022-09-15 ENCOUNTER — OFFICE VISIT (OUTPATIENT)
Dept: ORTHOPEDIC SURGERY | Age: 82
End: 2022-09-15
Payer: MEDICARE

## 2022-09-15 VITALS
WEIGHT: 159 LBS | TEMPERATURE: 97.1 F | OXYGEN SATURATION: 96 % | RESPIRATION RATE: 18 BRPM | HEART RATE: 64 BPM | BODY MASS INDEX: 30.54 KG/M2

## 2022-09-15 DIAGNOSIS — G03.9 ARACHNOIDITIS: ICD-10-CM

## 2022-09-15 DIAGNOSIS — M54.16 LUMBAR NEURITIS: ICD-10-CM

## 2022-09-15 DIAGNOSIS — M96.1 LUMBAR POST-LAMINECTOMY SYNDROME: ICD-10-CM

## 2022-09-15 DIAGNOSIS — M48.062 SPINAL STENOSIS OF LUMBAR REGION WITH NEUROGENIC CLAUDICATION: ICD-10-CM

## 2022-09-15 DIAGNOSIS — M51.36 DDD (DEGENERATIVE DISC DISEASE), LUMBAR: ICD-10-CM

## 2022-09-15 PROCEDURE — G8432 DEP SCR NOT DOC, RNG: HCPCS | Performed by: NURSE PRACTITIONER

## 2022-09-15 PROCEDURE — G8536 NO DOC ELDER MAL SCRN: HCPCS | Performed by: NURSE PRACTITIONER

## 2022-09-15 PROCEDURE — G8400 PT W/DXA NO RESULTS DOC: HCPCS | Performed by: NURSE PRACTITIONER

## 2022-09-15 PROCEDURE — G8427 DOCREV CUR MEDS BY ELIG CLIN: HCPCS | Performed by: NURSE PRACTITIONER

## 2022-09-15 PROCEDURE — G8417 CALC BMI ABV UP PARAM F/U: HCPCS | Performed by: NURSE PRACTITIONER

## 2022-09-15 PROCEDURE — 1090F PRES/ABSN URINE INCON ASSESS: CPT | Performed by: NURSE PRACTITIONER

## 2022-09-15 PROCEDURE — 99213 OFFICE O/P EST LOW 20 MIN: CPT | Performed by: NURSE PRACTITIONER

## 2022-09-15 PROCEDURE — 1101F PT FALLS ASSESS-DOCD LE1/YR: CPT | Performed by: NURSE PRACTITIONER

## 2022-09-15 PROCEDURE — 1123F ACP DISCUSS/DSCN MKR DOCD: CPT | Performed by: NURSE PRACTITIONER

## 2022-09-15 RX ORDER — DULOXETIN HYDROCHLORIDE 60 MG/1
60 CAPSULE, DELAYED RELEASE ORAL DAILY
Qty: 30 CAPSULE | Refills: 5 | Status: SHIPPED | OUTPATIENT
Start: 2022-09-15

## 2022-09-15 RX ORDER — TOPIRAMATE 25 MG/1
TABLET ORAL
Qty: 90 TABLET | Refills: 5 | Status: SHIPPED | OUTPATIENT
Start: 2022-09-15

## 2022-09-15 RX ORDER — TOPIRAMATE 25 MG/1
TABLET ORAL
Qty: 90 TABLET | Refills: 5 | Status: SHIPPED | OUTPATIENT
Start: 2022-09-15 | End: 2022-09-15 | Stop reason: SDUPTHER

## 2022-09-15 NOTE — PROGRESS NOTES
Chief complaint   Chief Complaint   Patient presents with    Back Pain       History of Present Illness:  Racquel Bocanegra is a  80 y.o.  female who comes in today for follow-up of her chronic low back pain with bilateral lower extremity pain down to her ankles. She continues to do well on Topamax 50 mg at bedtime and Cymbalta 60 mg daily. She denies any side effects. She states this combination medication keeps her pain mostly at 0 but once in a while she will get the leg pain if she does a lot of activity. She states Tylenol Extra Strength also helps. She is retired. She is a non-smoker. She denies fever bowel bladder dysfunction. Physical Exam: The patient is a 66-year-old female well-developed well-nourished who is alert and oriented with a normal mood and affect. She is a full weightbearing nonantalgic gait. She did not use any assist device. She has 4-5 strength bilateral lower extremities. Negative straight leg raise. 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 She has had prior surgery and has lumbar postlaminectomy syndrome. I have refilled her Topamax and  Cymbalta. We will see her back in 6 months sooner if needed now      Medications:  Current Outpatient Medications   Medication Sig Dispense Refill    DULoxetine (CYMBALTA) 60 mg capsule Take 1 Capsule by mouth daily. Indications: neuropathic pain 30 Capsule 5    topiramate (TOPAMAX) 25 mg tablet 3 tabs PO QHS 90 Tablet 5    levothyroxine (SYNTHROID) 25 mcg tablet Take 25 mcg by mouth. DULoxetine (CYMBALTA) 60 mg capsule Take 1 Cap by mouth daily. 90 Cap 1    naproxen (NAPROSYN) 500 mg tablet take 1 tablet by mouth twice a day if needed for pain  0    furosemide (LASIX) 20 mg tablet Take 20 mg by mouth daily as needed. 0    amiodarone (CORDARONE) 200 mg tablet Take 200 mg by mouth daily.   0    amLODIPine (NORVASC) 10 mg tablet Take 10 mg by mouth daily. 1    losartan (COZAAR) 100 mg tablet Take 100 mg by mouth daily. 0    metoprolol succinate (TOPROL-XL) 25 mg XL tablet Take 25 mg by mouth daily. 0    potassium chloride (K-DUR, KLOR-CON) 20 mEq tablet Take 20 mEq by mouth daily as needed. 1    pravastatin (PRAVACHOL) 40 mg tablet Take 40 mg by mouth daily. 1    RANEXA 500 mg SR tablet Take 500 mg by mouth daily. triamterene-hydrochlorothiazide (DYAZIDE) 37.5-25 mg per capsule Take 1 Cap by mouth every morning. 1    aspirin delayed-release 81 mg tablet Take  by mouth daily. cholecalciferol (VITAMIN D3) 1,000 unit tablet Take  by mouth daily. fluticasone (FLONASE) 50 mcg/actuation nasal spray 2 Sprays by Both Nostrils route daily. nitroglycerin (NITROSTAT) 0.4 mg SL tablet by SubLINGual route every five (5) minutes as needed for Chest Pain.              Review of systems:    Past Medical History:   Diagnosis Date    Arrhythmia     Cardiac disease     Hypercholesteremia     Hyperlipidemia     Hypertension     Lower back pain     Pacemaker      Past Surgical History:   Procedure Laterality Date    [place] BREAST SURGERY PROCEDURE UNLISTED Bilateral     bilateral breast reducution post procedure orderset 688 cyst right breast removed    [place] CARDIAC SURG PROCEDURE UNLIST      coronary artery bypass graft, coronary stent placement     Social History     Socioeconomic History    Marital status:      Spouse name: Not on file    Number of children: Not on file    Years of education: Not on file    Highest education level: Not on file   Occupational History    Not on file   Tobacco Use    Smoking status: Former     Packs/day: 0.50     Years: 30.00     Pack years: 15.00     Types: Cigarettes     Quit date: 3/21/2009     Years since quittin.4    Smokeless tobacco: Never   Vaping Use    Vaping Use: Never used   Substance and Sexual Activity    Alcohol use: No    Drug use: No    Sexual activity: Not on file Other Topics Concern    Not on file   Social History Narrative    Not on file     Social Determinants of Health     Financial Resource Strain: Not on file   Food Insecurity: Not on file   Transportation Needs: Not on file   Physical Activity: Not on file   Stress: Not on file   Social Connections: Not on file   Intimate Partner Violence: Not on file   Housing Stability: Not on file     Family History   Problem Relation Age of Onset    Hypertension Mother     Coronary Art Dis Mother     Hypertension Father     Coronary Art Dis Brother     Stroke Maternal Grandmother        Physical Exam:  Visit Vitals  Pulse 64   Temp 97.1 °F (36.2 °C)   Resp 18   Wt 159 lb (72.1 kg)   SpO2 96%   BMI 30.54 kg/m²     Pain Scale: 0 - No pain/10       has been . reviewed and is appropriate          Diagnoses and all orders for this visit:    1. Spinal stenosis of lumbar region with neurogenic claudication  -     DULoxetine (CYMBALTA) 60 mg capsule; Take 1 Capsule by mouth daily. Indications: neuropathic pain  -     topiramate (TOPAMAX) 25 mg tablet; 3 tabs PO QHS    2. Lumbar post-laminectomy syndrome  -     DULoxetine (CYMBALTA) 60 mg capsule; Take 1 Capsule by mouth daily. Indications: neuropathic pain  -     topiramate (TOPAMAX) 25 mg tablet; 3 tabs PO QHS    3. Lumbar neuritis  -     DULoxetine (CYMBALTA) 60 mg capsule; Take 1 Capsule by mouth daily. Indications: neuropathic pain  -     topiramate (TOPAMAX) 25 mg tablet; 3 tabs PO QHS    4. DDD (degenerative disc disease), lumbar  -     DULoxetine (CYMBALTA) 60 mg capsule; Take 1 Capsule by mouth daily. Indications: neuropathic pain  -     topiramate (TOPAMAX) 25 mg tablet; 3 tabs PO QHS    5. Arachnoiditis  -     DULoxetine (CYMBALTA) 60 mg capsule; Take 1 Capsule by mouth daily. Indications: neuropathic pain  -     topiramate (TOPAMAX) 25 mg tablet; 3 tabs PO QHS            Follow-up and Dispositions    Return in about 6 months (around 3/15/2023) for with NP [facility]. We have informed Pillo Hernandez to notify us for immediate appointment if she has any worsening neurogical symptoms or if an emergency situation presents, then call [phone]

## 2023-02-15 RX ORDER — DULOXETIN HYDROCHLORIDE 60 MG/1
60 CAPSULE, DELAYED RELEASE ORAL DAILY
Qty: 90 CAPSULE | OUTPATIENT
Start: 2023-02-15

## 2023-02-15 NOTE — TELEPHONE ENCOUNTER
Refill request received from [facility] pharmacy for cymbalta 60 mg tabs. Last refill was on 9-15-22 #30 with 5 RF. RX pended if appropriate. Last seen 9-15-22, next appt 3-16-22

## 2023-03-02 ENCOUNTER — TELEPHONE (OUTPATIENT)
Age: 83
End: 2023-03-02

## 2023-03-02 DIAGNOSIS — M48.062 SPINAL STENOSIS, LUMBAR REGION WITH NEUROGENIC CLAUDICATION: Primary | ICD-10-CM

## 2023-03-02 DIAGNOSIS — M96.1 POSTLAMINECTOMY SYNDROME, NOT ELSEWHERE CLASSIFIED: ICD-10-CM

## 2023-03-02 DIAGNOSIS — M54.16 RADICULOPATHY, LUMBAR REGION: ICD-10-CM

## 2023-03-02 RX ORDER — DULOXETIN HYDROCHLORIDE 60 MG/1
60 CAPSULE, DELAYED RELEASE ORAL DAILY
Qty: 30 CAPSULE | Refills: 0 | Status: SHIPPED | OUTPATIENT
Start: 2023-03-02 | End: 2023-03-16 | Stop reason: SDUPTHER

## 2023-03-16 ENCOUNTER — OFFICE VISIT (OUTPATIENT)
Age: 83
End: 2023-03-16
Payer: MEDICARE

## 2023-03-16 VITALS
BODY MASS INDEX: 29 KG/M2 | WEIGHT: 151 LBS | TEMPERATURE: 97.9 F | HEART RATE: 65 BPM | OXYGEN SATURATION: 94 % | RESPIRATION RATE: 17 BRPM

## 2023-03-16 DIAGNOSIS — M48.062 SPINAL STENOSIS, LUMBAR REGION WITH NEUROGENIC CLAUDICATION: ICD-10-CM

## 2023-03-16 DIAGNOSIS — M96.1 POSTLAMINECTOMY SYNDROME, NOT ELSEWHERE CLASSIFIED: ICD-10-CM

## 2023-03-16 DIAGNOSIS — M54.16 RADICULOPATHY, LUMBAR REGION: ICD-10-CM

## 2023-03-16 PROCEDURE — G8400 PT W/DXA NO RESULTS DOC: HCPCS | Performed by: NURSE PRACTITIONER

## 2023-03-16 PROCEDURE — 99213 OFFICE O/P EST LOW 20 MIN: CPT | Performed by: NURSE PRACTITIONER

## 2023-03-16 PROCEDURE — G8417 CALC BMI ABV UP PARAM F/U: HCPCS | Performed by: NURSE PRACTITIONER

## 2023-03-16 PROCEDURE — 1036F TOBACCO NON-USER: CPT | Performed by: NURSE PRACTITIONER

## 2023-03-16 PROCEDURE — G8427 DOCREV CUR MEDS BY ELIG CLIN: HCPCS | Performed by: NURSE PRACTITIONER

## 2023-03-16 PROCEDURE — 1090F PRES/ABSN URINE INCON ASSESS: CPT | Performed by: NURSE PRACTITIONER

## 2023-03-16 PROCEDURE — 1123F ACP DISCUSS/DSCN MKR DOCD: CPT | Performed by: NURSE PRACTITIONER

## 2023-03-16 PROCEDURE — G8484 FLU IMMUNIZE NO ADMIN: HCPCS | Performed by: NURSE PRACTITIONER

## 2023-03-16 RX ORDER — DULOXETIN HYDROCHLORIDE 60 MG/1
60 CAPSULE, DELAYED RELEASE ORAL DAILY
Qty: 30 CAPSULE | Refills: 5 | Status: SHIPPED | OUTPATIENT
Start: 2023-03-16

## 2023-03-16 RX ORDER — EZETIMIBE 10 MG/1
10 TABLET ORAL DAILY
COMMUNITY
Start: 2023-02-09

## 2023-03-16 RX ORDER — ACETAMINOPHEN 500 MG
1000 TABLET ORAL DAILY PRN
COMMUNITY

## 2023-03-16 RX ORDER — LOSARTAN POTASSIUM 50 MG/1
TABLET ORAL
COMMUNITY
Start: 2023-02-20

## 2023-03-16 RX ORDER — TOPIRAMATE 25 MG/1
TABLET ORAL
Qty: 60 TABLET | Refills: 5 | Status: SHIPPED | OUTPATIENT
Start: 2023-03-16

## 2023-03-16 NOTE — PROGRESS NOTES
Chief complaint   Chief Complaint   Patient presents with    Back Problem       History of Present Illness:  Hildred Mcburney is a  80 y.o.  female   who comes in today for follow-up of her chronic low back pain with bilateral lower extremity pain down to her ankles. She continues to do well on Topamax 50 mg at bedtime and Cymbalta 60 mg daily. She denies any side effects. She states this combination medication keeps her pain mostly at 0 but once in a while she will get the leg pain if she does a lot of activity. She states she did start having some right-sided muscular pain last week but Tylenol helps with that. She is retired. She is a non-smoker. She denies fever bowel bladder dysfunction. Physical Exam: The patient is a 80-year-old female well-developed well-nourished who is alert and oriented with a normal mood and affect. She is a full weightbearing nonantalgic gait. She did not use any assist device. She has 4-5 strength bilateral lower extremities. Negative straight leg raise. 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 She has had prior surgery and has lumbar postlaminectomy syndrome. I have refilled her Topamax and  Cymbalta. She will try heat for her muscular pain. We will see her back in 6 months sooner if needed now       Buck Arndt was seen today for back problem. Diagnoses and all orders for this visit:    Spinal stenosis, lumbar region with neurogenic claudication  -     DULoxetine (CYMBALTA) 60 MG extended release capsule; Take 1 capsule by mouth daily  -     topiramate (TOPAMAX) 25 MG tablet; 2 tabs PO QHS    Postlaminectomy syndrome, not elsewhere classified  -     DULoxetine (CYMBALTA) 60 MG extended release capsule;  Take 1 capsule by mouth daily  -     topiramate (TOPAMAX) 25 MG tablet; 2 tabs PO QHS    Radiculopathy, lumbar region  -     DULoxetine (CYMBALTA) 60 MG extended release capsule; Take 1 capsule by mouth daily  -     topiramate (TOPAMAX) 25 MG tablet; 2 tabs PO QHS      Return in about 6 months (around 9/16/2023) for fu with NP [facility].  has been . reviewed and is appropriate    Medications:  Current Outpatient Medications   Medication Sig Dispense Refill    acetaminophen (TYLENOL) 500 MG tablet Take 1,000 mg by mouth daily as needed      ezetimibe (ZETIA) 10 MG tablet Take 10 mg by mouth daily      losartan (COZAAR) 50 MG tablet take 1 tablet by mouth once daily      DULoxetine (CYMBALTA) 60 MG extended release capsule Take 1 capsule by mouth daily 30 capsule 5    topiramate (TOPAMAX) 25 MG tablet 2 tabs PO QHS 60 tablet 5    amiodarone (CORDARONE) 200 MG tablet Take 200 mg by mouth daily      amLODIPine (NORVASC) 10 MG tablet Take 10 mg by mouth daily      aspirin 81 MG EC tablet Take by mouth daily      vitamin D (CHOLECALCIFEROL) 25 MCG (1000 UT) TABS tablet Take by mouth daily      fluticasone (FLONASE) 50 MCG/ACT nasal spray 2 sprays by Nasal route daily      furosemide (LASIX) 20 MG tablet Take 20 mg by mouth daily as needed      levothyroxine (SYNTHROID) 25 MCG tablet Take 25 mcg by mouth      losartan (COZAAR) 100 MG tablet Take 100 mg by mouth daily      metoprolol succinate (TOPROL XL) 25 MG extended release tablet Take 25 mg by mouth daily      naproxen (NAPROSYN) 500 MG tablet take 1 tablet by mouth twice a day if needed for pain      nitroGLYCERIN (NITROSTAT) 0.4 MG SL tablet Place under the tongue      potassium chloride (KLOR-CON M) 20 MEQ extended release tablet Take 20 mEq by mouth daily as needed      pravastatin (PRAVACHOL) 40 MG tablet Take 40 mg by mouth daily      ranolazine (RANEXA) 500 MG extended release tablet Take 500 mg by mouth daily       No current facility-administered medications for this visit.        Review of systems:    Past Medical History:   Diagnosis Date    Arrhythmia     Cardiac disease Hypercholesteremia     Hyperlipidemia     Hypertension     Lower back pain     Pacemaker      Past Surgical History:   Procedure Laterality Date    BREAST SURGERY Bilateral     bilateral breast reducution post procedure orderset 786 cyst right breast removed    [place] CARDIAC SURG PROCEDURE UNLIST      coronary artery bypass graft, coronary stent placement     Social History     Socioeconomic History    Marital status:      Spouse name: Not on file    Number of children: Not on file    Years of education: Not on file    Highest education level: Not on file   Occupational History    Not on file   Tobacco Use    Smoking status: Former     Packs/day: 0.50     Types: Cigarettes     Quit date: 3/21/2009     Years since quittin.9    Smokeless tobacco: Never   Substance and Sexual Activity    Alcohol use: No    Drug use: No    Sexual activity: Not on file   Other Topics Concern    Not on file   Social History Narrative    Not on file     Social Determinants of Health     Financial Resource Strain: Not on file   Food Insecurity: Not on file   Transportation Needs: Not on file   Physical Activity: Not on file   Stress: Not on file   Social Connections: Not on file   Intimate Partner Violence: Not on file   Housing Stability: Not on file     Family History   Problem Relation Age of Onset    Coronary Art Dis Brother     Hypertension Mother     Coronary Art Dis Mother     Hypertension Father     Stroke Maternal Grandmother        Physical Exam:  Pulse 65   Temp 97.9 °F (36.6 °C)   Resp 17   Wt 151 lb (68.5 kg)   SpO2 94%   BMI 29.00 kg/m²   Pain Scale: 5/10      We have informed Shirley Mehta to notify us for immediate appointment if she has any worsening neurogical symptoms or if an emergency situation presents, then call [phone]    Please note that this dictation was completed with [email or web address], the [email or web address] voice recognition software.   Quite often unanticipated grammatical, syntax, homophones, and other interpretive errors are inadvertently transcribed by the computer software. Please disregard these errors. Please excuse any errors that have escaped final proofreading.      NEAL Martinez NP

## 2023-05-10 ENCOUNTER — TELEPHONE (OUTPATIENT)
Age: 83
End: 2023-05-10

## 2023-10-16 ENCOUNTER — TELEPHONE (OUTPATIENT)
Age: 83
End: 2023-10-16

## 2023-10-16 NOTE — TELEPHONE ENCOUNTER
Queen Luis Alberto from [facility] says a fax was sent back in June regarding the new medications. She faxed it to [phone]. She will re-fax it to [phone]. They received 2 out of 3 of the requested medications. They didn't receive the duloxetine 60 mg capsules. Queen Luis Alberto can be reached at [phone].

## 2023-10-17 NOTE — TELEPHONE ENCOUNTER
I am not sure what the pharmacist is talking about. Call pt and see if she is getting the lyrica and cymbalta from another practice now. If not, add her on as a VV today.

## 2023-10-19 ENCOUNTER — TELEMEDICINE (OUTPATIENT)
Age: 83
End: 2023-10-19
Payer: MEDICARE

## 2023-10-19 DIAGNOSIS — M54.16 RADICULOPATHY, LUMBAR REGION: ICD-10-CM

## 2023-10-19 DIAGNOSIS — M48.062 SPINAL STENOSIS, LUMBAR REGION WITH NEUROGENIC CLAUDICATION: ICD-10-CM

## 2023-10-19 DIAGNOSIS — M96.1 POSTLAMINECTOMY SYNDROME, NOT ELSEWHERE CLASSIFIED: ICD-10-CM

## 2023-10-19 PROCEDURE — 99443 PR PHYS/QHP TELEPHONE EVALUATION 21-30 MIN: CPT | Performed by: NURSE PRACTITIONER

## 2023-10-19 RX ORDER — DULOXETIN HYDROCHLORIDE 60 MG/1
60 CAPSULE, DELAYED RELEASE ORAL DAILY
Qty: 90 CAPSULE | Refills: 1 | Status: SHIPPED | OUTPATIENT
Start: 2023-10-19

## 2023-10-19 RX ORDER — TOPIRAMATE 25 MG/1
TABLET ORAL
Qty: 180 TABLET | Refills: 1 | Status: SHIPPED | OUTPATIENT
Start: 2023-10-19

## 2023-10-19 NOTE — PROGRESS NOTES
discussion including any medical advice provided: Yes    Total Time: minutes: 21-30 minutes    Julia Jo was evaluated through a synchronous (real-time) audio encounter. Patient identification was verified at the start of the visit. She (or guardian if applicable) is aware that this is a billable service, which includes applicable co-pays. This visit was conducted with the patient's (and/or legal guardian's) verbal consent. She has not had a related appointment within my department in the past 7 days or scheduled within the next 24 hours. The patient was located at Home: [address] [place] [address]. The provider was located at [facility] ([address] ): [address] [place] [address], [address] [facility].     I am licensed in the [facility]    Note: not billable if this call serves to triage the patient into an appointment for the relevant concern    NEAL Medellin NP

## 2023-10-19 NOTE — TELEPHONE ENCOUNTER
I called and spoke to the pt. The pt was identified using 2 pt identifiers. She could not verify who has been giving her her medications. She wants to have an appt with our office to discuss getting her refills. I offered her a telephone visit with the nurse practitioner this afternoon and she accepted the appt. I explained to the pt how the telephone visit process works. She verbalized understanding and has no questions or concerns.

## 2024-01-11 ENCOUNTER — OFFICE VISIT (OUTPATIENT)
Age: 84
End: 2024-01-11
Payer: MEDICARE

## 2024-01-11 VITALS
HEIGHT: 63 IN | OXYGEN SATURATION: 95 % | WEIGHT: 166 LBS | HEART RATE: 86 BPM | RESPIRATION RATE: 18 BRPM | BODY MASS INDEX: 29.41 KG/M2

## 2024-01-11 DIAGNOSIS — M54.16 RADICULOPATHY, LUMBAR REGION: ICD-10-CM

## 2024-01-11 DIAGNOSIS — M51.36 OTHER INTERVERTEBRAL DISC DEGENERATION, LUMBAR REGION: ICD-10-CM

## 2024-01-11 DIAGNOSIS — M96.1 POSTLAMINECTOMY SYNDROME, NOT ELSEWHERE CLASSIFIED: ICD-10-CM

## 2024-01-11 DIAGNOSIS — M48.062 SPINAL STENOSIS, LUMBAR REGION WITH NEUROGENIC CLAUDICATION: Primary | ICD-10-CM

## 2024-01-11 PROCEDURE — 1090F PRES/ABSN URINE INCON ASSESS: CPT | Performed by: NURSE PRACTITIONER

## 2024-01-11 PROCEDURE — G8484 FLU IMMUNIZE NO ADMIN: HCPCS | Performed by: NURSE PRACTITIONER

## 2024-01-11 PROCEDURE — 1123F ACP DISCUSS/DSCN MKR DOCD: CPT | Performed by: NURSE PRACTITIONER

## 2024-01-11 PROCEDURE — G8400 PT W/DXA NO RESULTS DOC: HCPCS | Performed by: NURSE PRACTITIONER

## 2024-01-11 PROCEDURE — G8427 DOCREV CUR MEDS BY ELIG CLIN: HCPCS | Performed by: NURSE PRACTITIONER

## 2024-01-11 PROCEDURE — 99214 OFFICE O/P EST MOD 30 MIN: CPT | Performed by: NURSE PRACTITIONER

## 2024-01-11 PROCEDURE — G8417 CALC BMI ABV UP PARAM F/U: HCPCS | Performed by: NURSE PRACTITIONER

## 2024-01-11 PROCEDURE — 1036F TOBACCO NON-USER: CPT | Performed by: NURSE PRACTITIONER

## 2024-01-11 RX ORDER — METHYLPREDNISOLONE 4 MG/1
TABLET ORAL
Qty: 1 KIT | Refills: 0 | Status: SHIPPED | OUTPATIENT
Start: 2024-01-11 | End: 2024-01-17

## 2024-01-11 RX ORDER — TEMAZEPAM 15 MG/1
CAPSULE ORAL
COMMUNITY
Start: 2023-11-29

## 2024-01-11 NOTE — PROGRESS NOTES
Madelin Packer presents today for   Chief Complaint   Patient presents with    Back Problem    Leg Pain     Bilateral         Is someone accompanying this pt? no    Is the patient using any DME equipment during OV? no    Depression Screening:       No data to display                Learning Assessment:      Abuse Screening:       No data to display                Fall Risk      OPIOID RISK TOOL      Coordination of Care:  1. Have you been to the ER, urgent care clinic since your last visit? no  Hospitalized since your last visit? no    2. Have you seen or consulted any other health care providers outside of the [facility] since your last visit? no Include any pap smears or colon screening. no    
100 mg by mouth daily (Patient not taking: Reported on 2024)       No current facility-administered medications for this visit.       Review of systems:    Past Medical History:   Diagnosis Date    Arrhythmia     Cardiac disease     Hypercholesteremia     Hyperlipidemia     Hypertension     Lower back pain     Pacemaker      Past Surgical History:   Procedure Laterality Date    BREAST SURGERY Bilateral     bilateral breast reducution post procedure orderset 688 cyst right breast removed    [facility] UNLISTED PROCEDURE CARDIAC SURGERY      coronary artery bypass graft, coronary stent placement     Social History     Socioeconomic History    Marital status:      Spouse name: Not on file    Number of children: Not on file    Years of education: Not on file    Highest education level: Not on file   Occupational History    Not on file   Tobacco Use    Smoking status: Former     Current packs/day: 0.00     Types: Cigarettes     Quit date: 3/21/2009     Years since quittin.8    Smokeless tobacco: Never   Substance and Sexual Activity    Alcohol use: No    Drug use: No    Sexual activity: Not on file   Other Topics Concern    Not on file   Social History Narrative    Not on file     Social Determinants of Health     Financial Resource Strain: Not on file   Food Insecurity: Not on file   Transportation Needs: Not on file   Physical Activity: Not on file   Stress: Not on file   Social Connections: Not on file   Intimate Partner Violence: Not on file   Housing Stability: Not on file     Family History   Problem Relation Age of Onset    Coronary Art Dis Brother     Hypertension Mother     Coronary Art Dis Mother     Hypertension Father     Stroke Maternal Grandmother        Physical Exam:  Pulse 86   Resp 18   Ht 1.6 m (5' 3\")   Wt 75.3 kg (166 lb)   SpO2 95% Comment: RA.  BMI 29.41 kg/m²   Pain Scale: 10 - Worst pain ever/10      We have informed Madelin Packer to notify us for immediate appointment if she has any

## 2024-07-01 ENCOUNTER — OFFICE VISIT (OUTPATIENT)
Age: 84
End: 2024-07-01
Payer: MEDICARE

## 2024-07-01 VITALS
HEIGHT: 63 IN | TEMPERATURE: 97.3 F | WEIGHT: 163.2 LBS | BODY MASS INDEX: 28.92 KG/M2 | OXYGEN SATURATION: 95 % | HEART RATE: 76 BPM

## 2024-07-01 DIAGNOSIS — M96.1 POSTLAMINECTOMY SYNDROME, NOT ELSEWHERE CLASSIFIED: ICD-10-CM

## 2024-07-01 DIAGNOSIS — M54.16 RADICULOPATHY, LUMBAR REGION: ICD-10-CM

## 2024-07-01 DIAGNOSIS — M51.36 OTHER INTERVERTEBRAL DISC DEGENERATION, LUMBAR REGION: ICD-10-CM

## 2024-07-01 DIAGNOSIS — M48.062 SPINAL STENOSIS, LUMBAR REGION WITH NEUROGENIC CLAUDICATION: Primary | ICD-10-CM

## 2024-07-01 DIAGNOSIS — R10.31 GROIN PAIN, RIGHT: ICD-10-CM

## 2024-07-01 PROCEDURE — 1036F TOBACCO NON-USER: CPT | Performed by: NURSE PRACTITIONER

## 2024-07-01 PROCEDURE — G8400 PT W/DXA NO RESULTS DOC: HCPCS | Performed by: NURSE PRACTITIONER

## 2024-07-01 PROCEDURE — G8427 DOCREV CUR MEDS BY ELIG CLIN: HCPCS | Performed by: NURSE PRACTITIONER

## 2024-07-01 PROCEDURE — 1090F PRES/ABSN URINE INCON ASSESS: CPT | Performed by: NURSE PRACTITIONER

## 2024-07-01 PROCEDURE — G8417 CALC BMI ABV UP PARAM F/U: HCPCS | Performed by: NURSE PRACTITIONER

## 2024-07-01 PROCEDURE — 1123F ACP DISCUSS/DSCN MKR DOCD: CPT | Performed by: NURSE PRACTITIONER

## 2024-07-01 PROCEDURE — 99214 OFFICE O/P EST MOD 30 MIN: CPT | Performed by: NURSE PRACTITIONER

## 2024-07-01 RX ORDER — AZELASTINE HYDROCHLORIDE 137 UG/1
SPRAY, METERED NASAL
COMMUNITY
Start: 2024-04-01

## 2024-07-01 RX ORDER — DULOXETIN HYDROCHLORIDE 60 MG/1
60 CAPSULE, DELAYED RELEASE ORAL DAILY
Qty: 90 CAPSULE | Refills: 1 | Status: SHIPPED | OUTPATIENT
Start: 2024-07-01

## 2024-07-01 RX ORDER — LEVOTHYROXINE SODIUM 0.05 MG/1
50 TABLET ORAL DAILY
COMMUNITY
Start: 2024-06-08

## 2024-07-01 RX ORDER — TOPIRAMATE 25 MG/1
TABLET ORAL
Qty: 180 TABLET | Refills: 1 | Status: SHIPPED | OUTPATIENT
Start: 2024-07-01

## 2024-07-01 RX ORDER — METHYLPREDNISOLONE 4 MG/1
TABLET ORAL
Qty: 1 KIT | Refills: 0 | Status: SHIPPED | OUTPATIENT
Start: 2024-07-01 | End: 2024-07-07

## 2024-07-01 NOTE — PROGRESS NOTES
Chief complaint   Chief Complaint   Patient presents with    Pain     Bilateral leg pain       History of Present Illness:  Madelin Packer is a  83 y.o.  female  who comes in today stating she has increased bilateral leg pain right greater than left.  Last visit in January she was having increased pain but it was the left greater than the right.  A Medrol Dosepak to calm down her pain for quite some time she is wondering if she could have that again.  She states it is the same type of pain she usually has just a little bit worse.  She denies any injuries or falls.  She is also having some right groin pain but denies hip pain.  She states when she takes Tylenol it does help as usual and she does continue to take her Topamax 50 mg at bedtime and 60 mg of Cymbalta daily.  Usually that keeps her pain level to 0 but she states today it is a 8 out of 10.  She has had a L3-4 fusion in the past and on her last CT done in 2017 it did show degenerative disc disease, facet arthropathy, mild central stenosis and arachnoiditis.  She denies fever bowel bladder dysfunction        Physical Exam:.  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  She did not use any assist device.  She has 4 out of 5 strength bilateral lower extremities.  Negative straight leg raise.  She did have some groin pain with internal and external rotation of the right hip.          Assessment and Plan: This is a patient who has known degenerative disc disease, facet arthropathy, central stenosis and arachnoiditis who is having some increased leg pain more on the right than the left at this time.  I will give her a Medrol Dosepak to see if we can calm that down.  She does take with food not take other anti-inflammatories with it.  She will continue her Cymbalta and Topamax.  I have refilled them for her.  We will see her back in 6 months sooner if needed.     Madelin was seen today for pain.    Diagnoses and all

## 2025-01-03 ENCOUNTER — OFFICE VISIT (OUTPATIENT)
Age: 85
End: 2025-01-03

## 2025-01-03 VITALS
WEIGHT: 161 LBS | BODY MASS INDEX: 28.53 KG/M2 | OXYGEN SATURATION: 96 % | DIASTOLIC BLOOD PRESSURE: 74 MMHG | SYSTOLIC BLOOD PRESSURE: 135 MMHG | RESPIRATION RATE: 18 BRPM | HEART RATE: 90 BPM | TEMPERATURE: 98.2 F | HEIGHT: 63 IN

## 2025-01-03 DIAGNOSIS — M54.16 RADICULOPATHY, LUMBAR REGION: ICD-10-CM

## 2025-01-03 DIAGNOSIS — M96.1 POSTLAMINECTOMY SYNDROME, NOT ELSEWHERE CLASSIFIED: ICD-10-CM

## 2025-01-03 DIAGNOSIS — M48.062 SPINAL STENOSIS, LUMBAR REGION WITH NEUROGENIC CLAUDICATION: ICD-10-CM

## 2025-01-03 RX ORDER — DULOXETIN HYDROCHLORIDE 60 MG/1
60 CAPSULE, DELAYED RELEASE ORAL DAILY
Qty: 90 CAPSULE | Refills: 1 | Status: SHIPPED | OUTPATIENT
Start: 2025-01-03

## 2025-01-03 RX ORDER — TOPIRAMATE 25 MG/1
75 TABLET, FILM COATED ORAL
Qty: 270 TABLET | Refills: 1 | Status: SHIPPED | OUTPATIENT
Start: 2025-01-03

## 2025-01-03 NOTE — PROGRESS NOTES
Chief complaint   Chief Complaint   Patient presents with    Back Problem    Pain    Leg Pain     Bilateral      Back Pain       History of Present Illness:  Madelin Packer is a  84 y.o.  female  who comes in today for follow-up of her chronic back and bilateral leg pain.  Sometimes it is worse on the right leg and sometimes on the left but today she states both legs are about equal in pain.  She rates her pain today as 8 out of 10.  She is on Topamax 50 mg at bedtime and 60 mg of Cymbalta daily.  Usually that keeps her pain controlled but it does not seem to be working quite as well.  She has had a L3-4 fusion in the past and on her last CT done in 2017 it did show degenerative disc disease, facet arthropathy, mild central stenosis and arachnoiditis.  She denies fever bowel bladder dysfunction        Physical Exam:.  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  She did not use any assist device.  She has 4 out of 5 strength bilateral lower extremities.  Negative straight leg raise.           Assessment and Plan: This is a patient who has known degenerative disc disease, facet arthropathy, central stenosis and arachnoiditis that gives her predominantly bilateral leg pain.  I will increase her Topamax to 75 mg at night.  I refilled her Cymbalta 60 mg daily.  I will see her back in 2 months for reevaluation.    Madelin was seen today for back problem, pain, leg pain and back pain.    Diagnoses and all orders for this visit:    Spinal stenosis, lumbar region with neurogenic claudication  -     topiramate (TOPAMAX) 25 MG tablet; Take 3 tablets by mouth nightly  -     DULoxetine (CYMBALTA) 60 MG extended release capsule; Take 1 capsule by mouth daily    Postlaminectomy syndrome, not elsewhere classified  -     topiramate (TOPAMAX) 25 MG tablet; Take 3 tablets by mouth nightly  -     DULoxetine (CYMBALTA) 60 MG extended release capsule; Take 1 capsule by mouth

## 2025-01-03 NOTE — PROGRESS NOTES
Madelin Packer presents today for   Chief Complaint   Patient presents with    Back Problem    Pain    Leg Pain     Bilateral      Back Pain       Is someone accompanying this pt? no    Is the patient using any DME equipment during OV? no    Depression Screening:       No data to display                Learning Assessment:  Failed to redirect to the Timeline version of the [employer].    Abuse Screening:       No data to display                Fall Risk  Failed to redirect to the Timeline version of the [employer].    OPIOID RISK TOOL  Failed to redirect to the Timeline version of the [employer] Smart[employer].    Coordination of Care:  1. Have you been to the ER, urgent care clinic since your last visit? no  Hospitalized since your last visit? no    2. Have you seen or consulted any other health care providers outside of the [facility] since your last visit? no Include any pap smears or colon screening. no

## 2025-01-29 ENCOUNTER — TELEPHONE (OUTPATIENT)
Age: 85
End: 2025-01-29

## 2025-01-29 DIAGNOSIS — M54.16 RADICULOPATHY, LUMBAR REGION: ICD-10-CM

## 2025-01-29 DIAGNOSIS — M96.1 POSTLAMINECTOMY SYNDROME, NOT ELSEWHERE CLASSIFIED: ICD-10-CM

## 2025-01-29 DIAGNOSIS — M48.062 SPINAL STENOSIS, LUMBAR REGION WITH NEUROGENIC CLAUDICATION: ICD-10-CM

## 2025-01-29 RX ORDER — TOPIRAMATE 25 MG/1
75 TABLET, FILM COATED ORAL
Qty: 270 TABLET | Refills: 1 | Status: SHIPPED | OUTPATIENT
Start: 2025-01-29 | End: 2025-02-27 | Stop reason: SDUPTHER

## 2025-01-29 NOTE — TELEPHONE ENCOUNTER
Patient called and is requesting to have her medication sent in to [facility] phone number provided below         topiramate (TOPAMAX) 25 MG tablet         [facility]   Phone   [phone]

## 2025-02-27 ENCOUNTER — OFFICE VISIT (OUTPATIENT)
Age: 85
End: 2025-02-27
Payer: MEDICARE

## 2025-02-27 VITALS
HEART RATE: 76 BPM | OXYGEN SATURATION: 94 % | BODY MASS INDEX: 28.6 KG/M2 | SYSTOLIC BLOOD PRESSURE: 124 MMHG | TEMPERATURE: 97.5 F | DIASTOLIC BLOOD PRESSURE: 71 MMHG | HEIGHT: 63 IN | WEIGHT: 161.4 LBS

## 2025-02-27 DIAGNOSIS — M54.16 RADICULOPATHY, LUMBAR REGION: ICD-10-CM

## 2025-02-27 DIAGNOSIS — M96.1 POSTLAMINECTOMY SYNDROME, NOT ELSEWHERE CLASSIFIED: ICD-10-CM

## 2025-02-27 DIAGNOSIS — M48.062 SPINAL STENOSIS, LUMBAR REGION WITH NEUROGENIC CLAUDICATION: ICD-10-CM

## 2025-02-27 PROCEDURE — 1090F PRES/ABSN URINE INCON ASSESS: CPT | Performed by: NURSE PRACTITIONER

## 2025-02-27 PROCEDURE — G8400 PT W/DXA NO RESULTS DOC: HCPCS | Performed by: NURSE PRACTITIONER

## 2025-02-27 PROCEDURE — 1123F ACP DISCUSS/DSCN MKR DOCD: CPT | Performed by: NURSE PRACTITIONER

## 2025-02-27 PROCEDURE — G8427 DOCREV CUR MEDS BY ELIG CLIN: HCPCS | Performed by: NURSE PRACTITIONER

## 2025-02-27 PROCEDURE — 1036F TOBACCO NON-USER: CPT | Performed by: NURSE PRACTITIONER

## 2025-02-27 PROCEDURE — 1159F MED LIST DOCD IN RCRD: CPT | Performed by: NURSE PRACTITIONER

## 2025-02-27 PROCEDURE — 99213 OFFICE O/P EST LOW 20 MIN: CPT | Performed by: NURSE PRACTITIONER

## 2025-02-27 PROCEDURE — 1160F RVW MEDS BY RX/DR IN RCRD: CPT | Performed by: NURSE PRACTITIONER

## 2025-02-27 PROCEDURE — G8417 CALC BMI ABV UP PARAM F/U: HCPCS | Performed by: NURSE PRACTITIONER

## 2025-02-27 PROCEDURE — 1126F AMNT PAIN NOTED NONE PRSNT: CPT | Performed by: NURSE PRACTITIONER

## 2025-02-27 RX ORDER — TOPIRAMATE 25 MG/1
75 TABLET, FILM COATED ORAL
Qty: 270 TABLET | Refills: 1 | Status: SHIPPED | OUTPATIENT
Start: 2025-02-27

## 2025-02-27 NOTE — PROGRESS NOTES
Chief complaint   Chief Complaint   Patient presents with    Follow-up     Lumbar       History of Present Illness:  Madelin Packer is a  84 y.o.  female   who comes in today for follow-up of her chronic back and bilateral leg pain.  Last visit we increased her Topamax to 75 mg at bedtime and continued the Cymbalta at 60 mg daily.  She states this has helped quite a bit and she would like to continue with that dose.  She has had a L3-4 fusion in the past and on her last CT done in 2017 it did show degenerative disc disease, facet arthropathy, mild central stenosis and arachnoiditis.  She denies fever bowel bladder dysfunction        Physical Exam:.  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  She did not use any assist device.  She has 4 out of 5 strength bilateral lower extremities.  Negative straight leg raise.           Assessment and Plan: This is a patient who has known degenerative disc disease, facet arthropathy, central stenosis and arachnoiditis that gives her predominantly bilateral leg pain.  I refilled her Topamax 25 mg at night.  She will continue the Cymbalta 60 mg daily.  I will see her back in 6 months sooner if needed.      Madelin was seen today for follow-up.    Diagnoses and all orders for this visit:    Spinal stenosis, lumbar region with neurogenic claudication  -     topiramate (TOPAMAX) 25 MG tablet; Take 3 tablets by mouth nightly    Postlaminectomy syndrome, not elsewhere classified  -     topiramate (TOPAMAX) 25 MG tablet; Take 3 tablets by mouth nightly    Radiculopathy, lumbar region  -     topiramate (TOPAMAX) 25 MG tablet; Take 3 tablets by mouth nightly        Return in about 6 months (around 8/27/2025).      has been .reviewed and is appropriate    Medications:  Current Outpatient Medications   Medication Sig Dispense Refill    topiramate (TOPAMAX) 25 MG tablet Take 3 tablets by mouth nightly 270 tablet 1    DULoxetine (CYMBALTA) 60 MG

## 2025-08-28 ENCOUNTER — OFFICE VISIT (OUTPATIENT)
Age: 85
End: 2025-08-28
Payer: MEDICARE

## 2025-08-28 VITALS
HEART RATE: 72 BPM | BODY MASS INDEX: 28 KG/M2 | OXYGEN SATURATION: 99 % | HEIGHT: 63 IN | TEMPERATURE: 98 F | WEIGHT: 158 LBS

## 2025-08-28 DIAGNOSIS — M54.42 CHRONIC BILATERAL LOW BACK PAIN WITH BILATERAL SCIATICA: Primary | ICD-10-CM

## 2025-08-28 DIAGNOSIS — M48.062 SPINAL STENOSIS, LUMBAR REGION WITH NEUROGENIC CLAUDICATION: ICD-10-CM

## 2025-08-28 DIAGNOSIS — M54.41 CHRONIC BILATERAL LOW BACK PAIN WITH BILATERAL SCIATICA: Primary | ICD-10-CM

## 2025-08-28 DIAGNOSIS — M54.16 RADICULOPATHY, LUMBAR REGION: ICD-10-CM

## 2025-08-28 DIAGNOSIS — G03.9 ARACHNOIDITIS: ICD-10-CM

## 2025-08-28 DIAGNOSIS — G89.29 CHRONIC BILATERAL LOW BACK PAIN WITH BILATERAL SCIATICA: Primary | ICD-10-CM

## 2025-08-28 DIAGNOSIS — M96.1 POSTLAMINECTOMY SYNDROME, NOT ELSEWHERE CLASSIFIED: ICD-10-CM

## 2025-08-28 PROCEDURE — 1090F PRES/ABSN URINE INCON ASSESS: CPT | Performed by: NURSE PRACTITIONER

## 2025-08-28 PROCEDURE — 1036F TOBACCO NON-USER: CPT | Performed by: NURSE PRACTITIONER

## 2025-08-28 PROCEDURE — G8400 PT W/DXA NO RESULTS DOC: HCPCS | Performed by: NURSE PRACTITIONER

## 2025-08-28 PROCEDURE — 1159F MED LIST DOCD IN RCRD: CPT | Performed by: NURSE PRACTITIONER

## 2025-08-28 PROCEDURE — 1123F ACP DISCUSS/DSCN MKR DOCD: CPT | Performed by: NURSE PRACTITIONER

## 2025-08-28 PROCEDURE — G8417 CALC BMI ABV UP PARAM F/U: HCPCS | Performed by: NURSE PRACTITIONER

## 2025-08-28 PROCEDURE — G8427 DOCREV CUR MEDS BY ELIG CLIN: HCPCS | Performed by: NURSE PRACTITIONER

## 2025-08-28 PROCEDURE — 1160F RVW MEDS BY RX/DR IN RCRD: CPT | Performed by: NURSE PRACTITIONER

## 2025-08-28 PROCEDURE — 1125F AMNT PAIN NOTED PAIN PRSNT: CPT | Performed by: NURSE PRACTITIONER

## 2025-08-28 PROCEDURE — 99214 OFFICE O/P EST MOD 30 MIN: CPT | Performed by: NURSE PRACTITIONER

## 2025-08-28 PROCEDURE — 72100 X-RAY EXAM L-S SPINE 2/3 VWS: CPT | Performed by: NURSE PRACTITIONER

## 2025-08-28 RX ORDER — TOPIRAMATE 25 MG/1
75 TABLET, FILM COATED ORAL 2 TIMES DAILY
Qty: 180 TABLET | Refills: 2 | Status: SHIPPED | OUTPATIENT
Start: 2025-08-28

## 2025-08-28 RX ORDER — TEMAZEPAM 15 MG/1
CAPSULE ORAL
COMMUNITY
Start: 2025-08-12

## 2025-08-28 RX ORDER — EVOLOCUMAB 140 MG/ML
140 INJECTION, SOLUTION SUBCUTANEOUS
COMMUNITY
Start: 2025-07-14

## 2025-08-28 RX ORDER — CILOSTAZOL 50 MG/1
100 TABLET ORAL 2 TIMES DAILY
COMMUNITY
Start: 2025-07-14

## 2025-08-28 RX ORDER — DULOXETIN HYDROCHLORIDE 60 MG/1
60 CAPSULE, DELAYED RELEASE ORAL DAILY
Qty: 90 CAPSULE | Refills: 1 | Status: SHIPPED | OUTPATIENT
Start: 2025-08-28

## 2025-08-28 RX ORDER — QUETIAPINE FUMARATE 50 MG/1
TABLET, FILM COATED ORAL
COMMUNITY
Start: 2025-07-17